# Patient Record
Sex: MALE | Race: BLACK OR AFRICAN AMERICAN | NOT HISPANIC OR LATINO | Employment: FULL TIME | URBAN - METROPOLITAN AREA
[De-identification: names, ages, dates, MRNs, and addresses within clinical notes are randomized per-mention and may not be internally consistent; named-entity substitution may affect disease eponyms.]

---

## 2023-12-10 ENCOUNTER — HOSPITAL ENCOUNTER (EMERGENCY)
Facility: CLINIC | Age: 63
Discharge: HOME | End: 2023-12-10
Attending: EMERGENCY MEDICINE | Admitting: EMERGENCY MEDICINE
Payer: OTHER MISCELLANEOUS

## 2023-12-10 ENCOUNTER — APPOINTMENT (OUTPATIENT)
Dept: GENERAL RADIOLOGY | Facility: CLINIC | Age: 63
End: 2023-12-10
Attending: EMERGENCY MEDICINE

## 2023-12-10 VITALS
DIASTOLIC BLOOD PRESSURE: 80 MMHG | TEMPERATURE: 98.7 F | HEIGHT: 66 IN | WEIGHT: 235 LBS | RESPIRATION RATE: 18 BRPM | BODY MASS INDEX: 37.77 KG/M2 | HEART RATE: 96 BPM | SYSTOLIC BLOOD PRESSURE: 144 MMHG | OXYGEN SATURATION: 99 %

## 2023-12-10 DIAGNOSIS — M54.41 ACUTE RIGHT-SIDED LOW BACK PAIN WITH RIGHT-SIDED SCIATICA: ICD-10-CM

## 2023-12-10 LAB
ANION GAP SERPL CALCULATED.3IONS-SCNC: 8 MMOL/L (ref 7–15)
BUN SERPL-MCNC: 17.7 MG/DL (ref 8–23)
CALCIUM SERPL-MCNC: 9.2 MG/DL (ref 8.8–10.2)
CHLORIDE SERPL-SCNC: 106 MMOL/L (ref 98–107)
CK SERPL-CCNC: 92 U/L (ref 39–308)
CREAT SERPL-MCNC: 1.15 MG/DL (ref 0.67–1.17)
DEPRECATED HCO3 PLAS-SCNC: 28 MMOL/L (ref 22–29)
EGFRCR SERPLBLD CKD-EPI 2021: 72 ML/MIN/1.73M2
GLUCOSE SERPL-MCNC: 89 MG/DL (ref 70–99)
HOLD SPECIMEN: NORMAL
POTASSIUM SERPL-SCNC: 4.4 MMOL/L (ref 3.4–5.3)
SODIUM SERPL-SCNC: 142 MMOL/L (ref 135–145)

## 2023-12-10 PROCEDURE — 36415 COLL VENOUS BLD VENIPUNCTURE: CPT | Performed by: EMERGENCY MEDICINE

## 2023-12-10 PROCEDURE — 73590 X-RAY EXAM OF LOWER LEG: CPT | Mod: RT

## 2023-12-10 PROCEDURE — 82550 ASSAY OF CK (CPK): CPT | Performed by: EMERGENCY MEDICINE

## 2023-12-10 PROCEDURE — 80048 BASIC METABOLIC PNL TOTAL CA: CPT | Performed by: EMERGENCY MEDICINE

## 2023-12-10 PROCEDURE — 99284 EMERGENCY DEPT VISIT MOD MDM: CPT

## 2023-12-10 RX ORDER — OXYCODONE HYDROCHLORIDE 5 MG/1
5 TABLET ORAL EVERY 6 HOURS PRN
Qty: 14 TABLET | Refills: 0 | Status: SHIPPED | OUTPATIENT
Start: 2023-12-10 | End: 2024-02-14

## 2023-12-10 ASSESSMENT — ACTIVITIES OF DAILY LIVING (ADL)
ADLS_ACUITY_SCORE: 35
ADLS_ACUITY_SCORE: 33

## 2023-12-10 NOTE — DISCHARGE INSTRUCTIONS
Recommend 1-2 extra strength Tylenol every 6 hours and or 60 mg ibuprofen every 6 hours for treatment of pain.  May also take 1 oxycodone every 6 hours as needed for breakthrough pain    May ice area of pain 20 minutes, 4 times per day for the next several days    May discontinue previously prescribed ketorolac and Flexeril as discussed

## 2023-12-10 NOTE — ED TRIAGE NOTES
Pt has had back pain that he was taking steroids for. He believes the pain and edema have traveled down to right lower leg. Pt states he had a work up for a blood clot 12/5/23  and was negative. Pain 10/10     Triage Assessment (Adult)       Row Name 12/10/23 0852          Triage Assessment    Airway WDL WDL        Respiratory WDL    Respiratory WDL WDL        Skin Circulation/Temperature WDL    Skin Circulation/Temperature WDL X  right leg edema        Cardiac WDL    Cardiac WDL X  HTN        Peripheral/Neurovascular WDL    Peripheral Neurovascular WDL X  Right leg edema and pain        Cognitive/Neuro/Behavioral WDL    Cognitive/Neuro/Behavioral WDL WDL

## 2023-12-10 NOTE — ED PROVIDER NOTES
"  History     Chief Complaint:  Leg Pain       HPI   Esteban Nciholas is a 63 year old male who presents with sharp right low back radiating down his right leg. 2 weeks ago patient was moving a patient at work and the day after he began to have pain and difficulty moving his left arm secondary to pain (this is since resolved). A week after he developed right lower back pain and was seen for this. He was given Medrol Dosepak which did not resolve his pain. His pain began to radiate down his right leg down to his ankle. 5 days ago he was had a negative lower extremity ultrasound. Here in the ED he denies numbness and recent falls or injuries.  Continues to have pain that radiates down his right leg.    Independent Historian:   None - Patient Only    Review of External Notes:   N/A      Medications:    Methylprednisolone    Flexeril   Diclofenac     Past Medical History:    The patient denies any significant past medical history.     Physical Exam   Patient Vitals for the past 24 hrs:   BP Temp Temp src Pulse Resp SpO2 Height Weight   12/10/23 0851 (!) 144/80 98.7  F (37.1  C) Temporal 96 18 99 % 1.676 m (5' 6\") 106.6 kg (235 lb)        Physical Exam  General: Patient in mild distress.  Alert and cooperative with exam. Normal mentation  HEENT: NC/AT. Conjunctiva without injection or scleral icterus. External ears normal.  Respiratory: Breathing comfortably on room air  CV: Normal rate, all extremities well perfused  GI:  Non-distended abdomen  Skin: Warm, dry, no rashes/open wounds on exposed skin  Musculoskeletal: No obvious deformities. Mild tenderness to right lower lumbar para spinal area without overlying skin change. No lower extremity edema. CMS intact. Able to ambulate without difficulty  Neuro: Alert, answers questions appropriately. No gross motor deficits      Emergency Department Course   Imaging:  XR Tibia and Fibula Right 2 Views   Final Result   IMPRESSION: No fracture or osseous lesion. Mild " degenerative changes in the knee. Soft tissues are unremarkable.             Laboratory:  Labs Ordered and Resulted from Time of ED Arrival to Time of ED Departure   CK TOTAL - Normal       Result Value    CK 92     BASIC METABOLIC PANEL - Normal    Sodium 142      Potassium 4.4      Chloride 106      Carbon Dioxide (CO2) 28      Anion Gap 8      Urea Nitrogen 17.7      Creatinine 1.15      GFR Estimate 72      Calcium 9.2      Glucose 89          Procedures   None     Emergency Department Course & Assessments:  Interventions:  Medications - No data to display     Assessments:  0922 I obtained history and examined the patient as noted above.     Independent Interpretation (X-rays, CTs, rhythm strip):  Right tib-fib x-ray: No fracture or dislocation    Consultations/Discussion of Management or Tests:  None        Social Determinants of Health affecting care:   Employed     Disposition:  The patient was discharged to home.     Impression & Plan    Medical Decision Making:  Esteban Nicholas is a 63 year old male who presents to the emergency department today for evaluation of right-sided low back pain with radiation to his right leg as well as feeling of deep pain in his right lower extremity.  On evaluation patient has no significant lower extremity swelling and recent ultrasound without evidence of DVT.  No overlying skin changes or evidence of skin infection.  Compartments are soft and compressible.  Tib-fib x-ray without significant acute findings.  No evidence of rhabdomyolysis; CK normal.  No history of trauma, fever, or demonstrable weakness/numbness on exam; no indication for MRI.  Presentation most consistent with sciatica.  Discussed supportive care and provided prescription for oxycodone for breakthrough pain.  Recommend continue use of NSAIDs/APAP.  Close follow-up with PCP if not improving.  Return precautions discussed.  Patient discharged home.    Diagnosis:    ICD-10-CM    1. Acute right-sided low back  pain with right-sided sciatica  M54.41            Discharge Medications:  Discharge Medication List as of 12/10/2023 11:59 AM        START taking these medications    Details   oxyCODONE (ROXICODONE) 5 MG tablet Take 1 tablet (5 mg) by mouth every 6 hours as needed for breakthrough pain, Disp-14 tablet, R-0, Local Print                Scribe Disclosure:  I, Simón Johnson, am serving as a scribe at 9:08 AM on 12/10/2023 to document services personally performed by Elian Mendez DO based on my observations and the provider's statements to me.   12/10/2023   Elian Mendez DO O'Neill, Christopher Warren, DO  12/10/23 2111

## 2023-12-20 ENCOUNTER — OFFICE VISIT (OUTPATIENT)
Dept: FAMILY MEDICINE | Facility: CLINIC | Age: 63
End: 2023-12-20
Payer: OTHER MISCELLANEOUS

## 2023-12-20 ENCOUNTER — ANCILLARY PROCEDURE (OUTPATIENT)
Dept: GENERAL RADIOLOGY | Facility: CLINIC | Age: 63
End: 2023-12-20
Attending: PHYSICIAN ASSISTANT
Payer: OTHER MISCELLANEOUS

## 2023-12-20 VITALS
WEIGHT: 234 LBS | HEART RATE: 73 BPM | DIASTOLIC BLOOD PRESSURE: 80 MMHG | SYSTOLIC BLOOD PRESSURE: 140 MMHG | RESPIRATION RATE: 18 BRPM | OXYGEN SATURATION: 98 % | TEMPERATURE: 97.9 F | HEIGHT: 66 IN | BODY MASS INDEX: 37.61 KG/M2

## 2023-12-20 DIAGNOSIS — M54.41 ACUTE RIGHT-SIDED LOW BACK PAIN WITH RIGHT-SIDED SCIATICA: Primary | ICD-10-CM

## 2023-12-20 DIAGNOSIS — M54.41 ACUTE RIGHT-SIDED LOW BACK PAIN WITH RIGHT-SIDED SCIATICA: ICD-10-CM

## 2023-12-20 PROCEDURE — 99204 OFFICE O/P NEW MOD 45 MIN: CPT | Performed by: PHYSICIAN ASSISTANT

## 2023-12-20 PROCEDURE — 72100 X-RAY EXAM L-S SPINE 2/3 VWS: CPT | Mod: TC | Performed by: RADIOLOGY

## 2023-12-20 RX ORDER — IBUPROFEN 200 MG
200 TABLET ORAL EVERY 4 HOURS PRN
COMMUNITY

## 2023-12-20 RX ORDER — OXYCODONE HYDROCHLORIDE 5 MG/1
TABLET ORAL
Qty: 30 TABLET | Refills: 0 | Status: SHIPPED | OUTPATIENT
Start: 2023-12-20 | End: 2024-01-04

## 2023-12-20 RX ORDER — METHYLPREDNISOLONE 4 MG
TABLET, DOSE PACK ORAL
Qty: 21 TABLET | Refills: 0 | Status: SHIPPED | OUTPATIENT
Start: 2023-12-20 | End: 2024-01-11

## 2023-12-20 ASSESSMENT — PAIN SCALES - GENERAL: PAINLEVEL: EXTREME PAIN (8)

## 2023-12-20 NOTE — LETTER
December 20, 2023      Esteban Nicholas  [address]   [place] [address]        To Whom It May Concern:    Esteban Nicholas  was seen on 12/20/23.  Please excuse him until 1/10/23 due to injury.        Sincerely,        Jez Everett PA-C

## 2023-12-20 NOTE — RESULT ENCOUNTER NOTE
Can we let him know his lumbar xray looks good. No fracture or acute issues. Mild degenerative changes

## 2023-12-20 NOTE — PROGRESS NOTES
"  Assessment & Plan     Acute right-sided low back pain with right-sided sciatica  On-going lower lumbar radicular pain. XR lumbar today. PE without red flags. PT referral placed. Tylenol/Motrin alternating. Lidocaine patches. Medrol dosepak. Oxycodone 1-2 5 mg tabs q6 hours. Discussed safety and side effects. Should setup with spine - may need additional imaging MRI. Close follow-up with any additional symptoms. Work note for 3 weeks. Did discuss potential for FMLA.     - XR Lumbar Spine 2/3 Views  - Physical Therapy Referral  - methylPREDNISolone (MEDROL DOSEPAK) 4 MG tablet therapy pack  Dispense: 21 tablet; Refill: 0  - oxyCODONE (ROXICODONE) 5 MG tablet  Dispense: 30 tablet; Refill: 0    30 minutes spent by me on the date of the encounter doing chart review, review of test results, interpretation of tests, patient visit, and documentation      MED REC REQUIRED  Post Medication Reconciliation Status: discharge medications reconciled and changed, per note/orders  BMI:   Estimated body mass index is 37.77 kg/m  as calculated from the following:    Height as of this encounter: 1.676 m (5' 6\").    Weight as of this encounter: 106.1 kg (234 lb).   Weight management plan: Discussed healthy diet and exercise guidelines    The likelihood of other entities in the differential is insufficient to justify any further testing for them at this time. This was explained to the patient. The patient was advised that persistent or worsening symptoms would require further evaluation. Patient advised to call the office and if unable to reach to go to the emergency room if they develop any new or worsening symptoms. Expressed understanding and agreement with above stated plan.     MARTINEZ [facility] DIANNA Orellana is a very pleasant 63 year old, presenting for the following health issues:  ER F/U    ER follow-up for on-going right sided lower back pain with radiation to anterior tib " "region.   Pain started 11/22/23 following lifting a patient at work. Works as a MA.     Alternating Tylenol and Motrin  Oxycodone 5 mg most helpful but no complete resolution of pain.   No numbness/tingling or loss of bowel/bladder. No fever, chills, sweats.     ED/UC Followup:    Facility:  [facility] Emergency Dept  Date of visit: 12/10/23  Reason for visit: Acute right-sided low back pain with right-sided sciatica   Current Status: Pt continues to have pain in waist and right leg 8/10        Review of Systems   Constitutional, HEENT, cardiovascular, pulmonary, GI, , musculoskeletal, neuro, skin, endocrine and psych systems are negative, except as otherwise noted.      Objective    BP (!) 140/80   Pulse 73   Temp 97.9  F (36.6  C) (Temporal)   Resp 18   Ht 1.676 m (5' 6\")   Wt 106.1 kg (234 lb)   SpO2 98%   BMI 37.77 kg/m    Body mass index is 37.77 kg/m .  Physical Exam   GENERAL: healthy, alert and no distress  EYES: Eyes grossly normal to inspection, PERRL and conjunctivae and sclerae normal  NECK: no adenopathy, no asymmetry, masses, or scars and thyroid normal to palpation. ROM intact.   RESP: lungs clear to auscultation - no rales, rhonchi or wheezes  CV: regular rate and rhythm, normal S1 S2, no S3 or S4, no murmur, click or rub, no peripheral edema  MS: no gross musculoskeletal defects noted, no edema  No tenderness noted on palpation of spinous processes. Spinous processes are midline. Cervical, thoracic, and lumbar paraspinal muscles are non tender. Full ROM including flexion, extension, and side to side rotation of thoracic and lumbar spine are noted and without elicited discomfort. Straight leg raise elicits pains. Sensation to upper and lower extremities is intact bilaterally. 5/5 strength to LE.  SKIN: no suspicious lesions or rashes  NEURO: Normal strength and tone, mentation intact and speech normal  PSYCH: mentation appears normal, affect normal/bright            "

## 2023-12-20 NOTE — PATIENT INSTRUCTIONS
-Heat  -Alternate Tylenol/Ibuprofen  -Lidocaine patches OTC   -Medrol dose leodan  -Oxycodone.     Start PT  Xray of lumbar today    Setup with spine  [facility]  [address] [place] [address] [phone]

## 2023-12-26 ENCOUNTER — THERAPY VISIT (OUTPATIENT)
Dept: PHYSICAL THERAPY | Facility: CLINIC | Age: 63
End: 2023-12-26
Attending: PHYSICIAN ASSISTANT
Payer: OTHER MISCELLANEOUS

## 2023-12-26 DIAGNOSIS — M54.41 ACUTE RIGHT-SIDED LOW BACK PAIN WITH RIGHT-SIDED SCIATICA: ICD-10-CM

## 2023-12-26 DIAGNOSIS — R26.9 ABNORMAL GAIT: Primary | ICD-10-CM

## 2023-12-26 PROCEDURE — 97161 PT EVAL LOW COMPLEX 20 MIN: CPT | Mod: GP | Performed by: PHYSICAL THERAPIST

## 2023-12-26 PROCEDURE — 97110 THERAPEUTIC EXERCISES: CPT | Mod: GP | Performed by: PHYSICAL THERAPIST

## 2023-12-26 NOTE — PROGRESS NOTES
PHYSICAL THERAPY EVALUATION  Type of Visit: Evaluation    See electronic medical record for Abuse and Falls Screening details.    Subjective       Presenting condition or subjective complaint:  Sudden onset pain 11/22 after lifting a client. Noticed pain start in the left arm at elbow - needed to massage/rub arm because it had gotten numb. It resolved. About a week later sudden onset pain in the Right buttock that went down lateral LE to ankle. Went to the ED; imaging benign and got prednisone. Prednisone did not help unfortunately; went back to the ED a couple days later. Imaging benign for fx; oxycodone helped for a short period. Was able to renew oxy at the ED (3rd visit) as he did not have a PCP. Has now est with a PCP, who sent him to PT.  Hurts primarily in R ankle, sometimes knee and occasionally R buttock  AGGS: walking, laying on right side and back, bending forward  EASES: oxycodone helpful. NSAIDs a little helpful. / Ice and heat, topicals did not help.   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 +  Hobbies/Interests:  does not work out, physical job     Patient goals for therapy:  Get back to full time work     Pain assessment: see HPI     Objective   LUMBAR SPINE EVALUATION  PAIN:   INTEGUMENTARY (edema, incisions):   POSTURE: lateral shift left, but corrective sideglide right in both standing and prone increased peripheral sx intensity at ankle  GAIT:   Weightbearing Status: WBAT  Assistive Device(s): None  Gait Deviations: Decreased gait speed, lacks terminal  knee extension on left, with decreased left stance time and right stride length, lacks left knee flexion during swing phase. Antalgic. Improved with use of SPC but not normalized  BALANCE/PROPRIOCEPTION: not formally assessed  WEIGHTBEARING ALIGNMENT: lateral shift left   NON-WEIGHTBEARING ALIGNMENT: lateral shift left; lateral gliding right increased peripheral sx. / sidelying left with left sideglide was only position that did not peripheralize sx (no worse)    ROM: Lumbar flexion limited approx 25% / lumbar extension with repeated motions no worse to peripheral sx.   PELVIC/SI SCREEN:   STRENGTH:   MYOTOMES: intact along LE myotomes and 5/5 hip abduction/adduction and hip ER/IR. EXCEPT right hip flexion and knee extension weak and painful   DTR S: not assessed  CORD SIGNS: denies cauda equina red flags (bowel/bladder changes, sudden sensation or strength changes, saddle anesthesia)  DERMATOMES: not assessed  NEURAL TENSION: + SLUMP R      Assessment & Plan   CLINICAL IMPRESSIONS  Medical Diagnosis: Acute right-sided low back pain with right-sided sciatica (M54.41)    Treatment Diagnosis: acute low back pain with radicular pain - lateral shift L   Impression/Assessment: Patient is a 63 year old male with R sided radicular LE pain complaints.  The following significant findings have been identified: Pain, Decreased ROM/flexibility, Decreased strength, Impaired gait, Impaired muscle performance, Decreased activity tolerance, and Impaired posture. These impairments interfere with their ability to perform self care tasks, work tasks, recreational activities, household chores, driving , household mobility, and community mobility as compared to previous level of function.     Clinical Decision Making (Complexity):  Clinical Presentation: Stable/Uncomplicated  Clinical Presentation Rationale: based on medical and personal factors listed in PT evaluation  Clinical Decision Making (Complexity): Low complexity    PLAN OF  CARE  Treatment Interventions:  Modalities: Cryotherapy, Fluidotherapy, E-stim  Interventions: Gait Training, Manual Therapy, Neuromuscular Re-education, Therapeutic Activity, Therapeutic Exercise, Self-Care/Home Management    Long Term Goals     PT Goal 1  Goal Identifier: Ambulation  Goal Description: Patient will report ability to ambulate half the day at work with normalized gait, no AD, and no increase in pain to promote a healthy and active lifestyle as well as return to work fully.  Target Date: 03/19/24  PT Goal 2  Goal Identifier: Lifting/bending  Goal Description: Patient will demonstrate lifting 25# item from floor to waist height with hip hinge strategy and no increase in pain x 5 reps to indicate ability to complete household tasks such as dressing, groceries, laundry, and yardwork.  Target Date: 03/19/24      Frequency of Treatment: 1x/weekly, tapering to every other week  Duration of Treatment: up to 12 weeks    Recommended Referrals to Other Professionals:  none  Education Assessment:   Education Comments: PTRx handouts    Risks and benefits of evaluation/treatment have been explained.   Patient/Family/caregiver agrees with Plan of Care.     Evaluation Time:     PT Eval, Low Complexity Minutes (64866): 20       Signing Clinician: CJ RESENDEZ, PT

## 2024-01-02 DIAGNOSIS — M54.41 ACUTE RIGHT-SIDED LOW BACK PAIN WITH RIGHT-SIDED SCIATICA: ICD-10-CM

## 2024-01-02 NOTE — TELEPHONE ENCOUNTER
Medication Question or Refill    Contacts         Type Contact Phone/Fax    01/02/2024 09:20 AM CST Phone (Incoming) Esteban Nicholas VAISHALI (Self) [phone] (H)     Need refill of Oxycodone called in to [facility]. Please call back to let them know when called in.            What medication are you calling about (include dose and sig)?: Oxycodone 5mg.     Preferred Pharmacy:   [id number] - [place], [place] - [address] [address]  Phone: [phone] Fax: [phone]      Controlled Substance Agreement on file:   CSA -- Patient Level:    CSA: None found at the patient level.       Who prescribed the medication?: Dr. Jez Everett    Do you need a refill? Yes    When did you use the medication last? A week ago    Patient offered an appointment? Yes: Appt via phone 01/11/2024    Do you have any questions or concerns?  No      Okay to leave a detailed message?: Yes at Cell number on file:    Telephone Information:   Mobile [phone]

## 2024-01-04 RX ORDER — OXYCODONE HYDROCHLORIDE 5 MG/1
TABLET ORAL
Qty: 30 TABLET | Refills: 0 | Status: SHIPPED | OUTPATIENT
Start: 2024-01-04 | End: 2024-01-11

## 2024-01-10 ENCOUNTER — THERAPY VISIT (OUTPATIENT)
Dept: PHYSICAL THERAPY | Facility: CLINIC | Age: 64
End: 2024-01-10
Payer: OTHER MISCELLANEOUS

## 2024-01-10 DIAGNOSIS — R26.9 ABNORMAL GAIT: ICD-10-CM

## 2024-01-10 DIAGNOSIS — M54.41 ACUTE RIGHT-SIDED LOW BACK PAIN WITH RIGHT-SIDED SCIATICA: Primary | ICD-10-CM

## 2024-01-10 PROCEDURE — 97110 THERAPEUTIC EXERCISES: CPT | Mod: GP

## 2024-01-11 ENCOUNTER — VIRTUAL VISIT (OUTPATIENT)
Dept: FAMILY MEDICINE | Facility: CLINIC | Age: 64
End: 2024-01-11
Payer: OTHER MISCELLANEOUS

## 2024-01-11 DIAGNOSIS — M54.41 ACUTE RIGHT-SIDED LOW BACK PAIN WITH RIGHT-SIDED SCIATICA: ICD-10-CM

## 2024-01-11 PROCEDURE — 99442 PR PHYSICIAN TELEPHONE EVALUATION 11-20 MIN: CPT | Mod: 93 | Performed by: PHYSICIAN ASSISTANT

## 2024-01-11 RX ORDER — METHYLPREDNISOLONE 4 MG
TABLET, DOSE PACK ORAL
Qty: 21 TABLET | Refills: 0 | Status: SHIPPED | OUTPATIENT
Start: 2024-01-11 | End: 2024-05-09

## 2024-01-11 RX ORDER — OXYCODONE HYDROCHLORIDE 5 MG/1
TABLET ORAL
Qty: 30 TABLET | Refills: 0 | Status: SHIPPED | OUTPATIENT
Start: 2024-01-11 | End: 2024-05-09

## 2024-01-11 NOTE — LETTER
January 11, 2024      Esteban Nicholas  [address]   [place] [address]        To Whom It May Concern:    Esteban Nicholas was seen on 1/11/24.  Please excuse him until 3/11/24 due to injury.      Sincerely,        Jez Everett PA-C

## 2024-01-11 NOTE — PROGRESS NOTES
Esteban is a 63 year old who is being evaluated via a billable telephone visit.      What phone number would you like to be contacted at? [phone]  How would you like to obtain your AVS? Edith    Distant Location (provider location):  On-site    Assessment & Plan     Acute right-sided low back pain with right-sided sciatica  Reasonable to repeat Medrol Dosepak as well as to provide him with additional refill of oxycodone.  Discussed with him that this is a short-term refill.  Not intended for long-term use.  Discussed safety and side effects and negative nature of this medication.  Continue physical therapy.  Spine referral placed for further evaluation.  Reviewed signs symptoms that warrant urgent/emergent reevaluation.  Comfortable with plan.  - methylPREDNISolone (MEDROL DOSEPAK) 4 MG tablet therapy pack  Dispense: 21 tablet; Refill: 0  - oxyCODONE (ROXICODONE) 5 MG tablet  Dispense: 30 tablet; Refill: 0  - Spine  Referral    15 minutes spent by me on the date of the encounter doing chart review, review of test results, interpretation of tests, patient visit, and documentation      MED REC REQUIRED  Post Medication Reconciliation Status: discharge medications reconciled and changed, per note/orders    The likelihood of other entities in the differential is insufficient to justify any further testing for them at this time. This was explained to the patient. The patient was advised that persistent or worsening symptoms would require further evaluation. Patient advised to call the office and if unable to reach to go to the emergency room if they develop any new or worsening symptoms. Expressed understanding and agreement with above stated plan.     Jez Everett PA-C  [facility] DIANNA Orellana is a 63 year old, presenting for the following health issues:  Establish Care, Refill Request ( Need refill of Oxycodone ), and Form Request (Work note , excuse from work  1-2 month, Fax [phone], call him to notify of action with note)    Following up in regards to ongoing right lower back/right lower leg pain.  Mildly resolved since last time he had a visit.  Has started physical therapy which she believes is slowly helping.  Still unable to walk.  Requesting additional work note to keep him out of work due to this.  Notes pain in his right lower extremity when walking for greater than 5 minutes or for standing for prolonged periods of time.  Pain most notable in the right ankle and right thigh region.  Requesting refill on Medrol Dosepak as well as oxycodone.  No loss of bowel/bladder.  No fever, chills, sweats.      Review of Systems   Constitutional, HEENT, cardiovascular, pulmonary, GI, , musculoskeletal, neuro, skin, endocrine and psych systems are negative, except as otherwise noted.      Objective           Vitals:  No vitals were obtained today due to virtual visit.    Physical Exam   healthy, alert, and no distress  PSYCH: Alert and oriented times 3; coherent speech, normal   rate and volume, able to articulate logical thoughts, able   to abstract reason, no tangential thoughts, no hallucinations   or delusions  His affect is normal  RESP: No cough, no audible wheezing, able to talk in full sentences  Remainder of exam unable to be completed due to telephone visits          Phone call duration:  10 minutes

## 2024-01-18 ENCOUNTER — THERAPY VISIT (OUTPATIENT)
Dept: PHYSICAL THERAPY | Facility: CLINIC | Age: 64
End: 2024-01-18
Attending: PHYSICIAN ASSISTANT
Payer: OTHER MISCELLANEOUS

## 2024-01-18 DIAGNOSIS — R26.9 ABNORMAL GAIT: ICD-10-CM

## 2024-01-18 DIAGNOSIS — M54.41 ACUTE RIGHT-SIDED LOW BACK PAIN WITH RIGHT-SIDED SCIATICA: Primary | ICD-10-CM

## 2024-01-18 PROCEDURE — 97140 MANUAL THERAPY 1/> REGIONS: CPT | Mod: GP | Performed by: PHYSICAL THERAPIST

## 2024-01-18 PROCEDURE — 97110 THERAPEUTIC EXERCISES: CPT | Mod: GP | Performed by: PHYSICAL THERAPIST

## 2024-01-25 ENCOUNTER — THERAPY VISIT (OUTPATIENT)
Dept: PHYSICAL THERAPY | Facility: CLINIC | Age: 64
End: 2024-01-25
Attending: PHYSICIAN ASSISTANT
Payer: OTHER MISCELLANEOUS

## 2024-01-25 DIAGNOSIS — M54.41 ACUTE RIGHT-SIDED LOW BACK PAIN WITH RIGHT-SIDED SCIATICA: Primary | ICD-10-CM

## 2024-01-25 DIAGNOSIS — R26.9 ABNORMAL GAIT: ICD-10-CM

## 2024-01-25 PROCEDURE — 97110 THERAPEUTIC EXERCISES: CPT | Mod: GP | Performed by: PHYSICAL THERAPIST

## 2024-01-25 PROCEDURE — 97140 MANUAL THERAPY 1/> REGIONS: CPT | Mod: GP | Performed by: PHYSICAL THERAPIST

## 2024-02-01 ENCOUNTER — THERAPY VISIT (OUTPATIENT)
Dept: PHYSICAL THERAPY | Facility: CLINIC | Age: 64
End: 2024-02-01
Payer: OTHER MISCELLANEOUS

## 2024-02-01 DIAGNOSIS — R26.9 ABNORMAL GAIT: ICD-10-CM

## 2024-02-01 DIAGNOSIS — M54.41 ACUTE RIGHT-SIDED LOW BACK PAIN WITH RIGHT-SIDED SCIATICA: Primary | ICD-10-CM

## 2024-02-01 PROCEDURE — 97110 THERAPEUTIC EXERCISES: CPT | Mod: GP | Performed by: PHYSICAL THERAPIST

## 2024-02-01 PROCEDURE — 97530 THERAPEUTIC ACTIVITIES: CPT | Mod: GP | Performed by: PHYSICAL THERAPIST

## 2024-02-01 PROCEDURE — 97140 MANUAL THERAPY 1/> REGIONS: CPT | Mod: GP | Performed by: PHYSICAL THERAPIST

## 2024-02-08 ENCOUNTER — THERAPY VISIT (OUTPATIENT)
Dept: PHYSICAL THERAPY | Facility: CLINIC | Age: 64
End: 2024-02-08
Payer: OTHER MISCELLANEOUS

## 2024-02-08 DIAGNOSIS — M54.41 ACUTE RIGHT-SIDED LOW BACK PAIN WITH RIGHT-SIDED SCIATICA: Primary | ICD-10-CM

## 2024-02-08 DIAGNOSIS — R26.9 ABNORMAL GAIT: ICD-10-CM

## 2024-02-08 PROCEDURE — 97140 MANUAL THERAPY 1/> REGIONS: CPT | Mod: GP | Performed by: PHYSICAL THERAPIST

## 2024-02-08 PROCEDURE — 97110 THERAPEUTIC EXERCISES: CPT | Mod: GP | Performed by: PHYSICAL THERAPIST

## 2024-02-14 ENCOUNTER — OFFICE VISIT (OUTPATIENT)
Dept: FAMILY MEDICINE | Facility: CLINIC | Age: 64
End: 2024-02-14
Payer: OTHER MISCELLANEOUS

## 2024-02-14 VITALS
SYSTOLIC BLOOD PRESSURE: 142 MMHG | HEART RATE: 77 BPM | DIASTOLIC BLOOD PRESSURE: 85 MMHG | BODY MASS INDEX: 38.63 KG/M2 | TEMPERATURE: 98 F | OXYGEN SATURATION: 100 % | WEIGHT: 240.4 LBS | RESPIRATION RATE: 18 BRPM | HEIGHT: 66 IN

## 2024-02-14 DIAGNOSIS — R03.0 ELEVATED BP WITHOUT DIAGNOSIS OF HYPERTENSION: ICD-10-CM

## 2024-02-14 DIAGNOSIS — M79.89 LEG SWELLING: ICD-10-CM

## 2024-02-14 DIAGNOSIS — M54.41 ACUTE RIGHT-SIDED LOW BACK PAIN WITH RIGHT-SIDED SCIATICA: Primary | ICD-10-CM

## 2024-02-14 PROCEDURE — 99214 OFFICE O/P EST MOD 30 MIN: CPT | Performed by: PHYSICIAN ASSISTANT

## 2024-02-14 RX ORDER — GABAPENTIN 100 MG/1
100 CAPSULE ORAL DAILY PRN
Qty: 90 CAPSULE | Refills: 1 | Status: SHIPPED | OUTPATIENT
Start: 2024-02-14 | End: 2024-06-04

## 2024-02-14 RX ORDER — OXYCODONE AND ACETAMINOPHEN 5; 325 MG/1; MG/1
1 TABLET ORAL EVERY 6 HOURS PRN
Qty: 15 TABLET | Refills: 0 | Status: SHIPPED | OUTPATIENT
Start: 2024-02-14 | End: 2024-04-09

## 2024-02-14 ASSESSMENT — PAIN SCALES - GENERAL: PAINLEVEL: SEVERE PAIN (6)

## 2024-02-14 NOTE — PATIENT INSTRUCTIONS
-Setup with Spine/Ortho: Call [phone] to schedule.  -Continue PT - keep up the good work!   -Compression stockings, leg elevations for leg swelling.    - BP cuff - check BP at home. Bring in readings at next visit.     Pain:     -Will send refill of oxycodone. Limit if possible. Only for severe pain.   -Start Gabapentin for pain:     Gabapentin 1 tab= 100mg   AM   PM   Bedtime   0   0   100mg (1 tab). If tolerating, after 2-3 days, increase as tolerated to next line   100mg (1 tab)      100mg (1 tab)       100mg (1 tab). If tolerating, after 2-3 days, increase as tolerated to next line   200mg (2 tabs)  200mg (2 tabs)  200mg (2 tabs). If tolerating, after 2-3 days, increase as tolerated to next line   300mg (3 tabs)  300mg (3 tabs)  300mg (3 tabs). Call us when you are at this dose or with any concerns.   Don't drive on this medication until you know how it effects you.  Common side effects are drowsiness and dizziness  You can go slower if you need to.  For example, you can start to increase by just going up on the bedtime dose.  This medication cannot be stopped abruptly once higher doses are achieved.  Seek medical advised on how to stop this medication if needed.

## 2024-02-14 NOTE — PROGRESS NOTES
Assessment & Plan     Acute right-sided low back pain with right-sided sciatica  Provided him referral to spine specialist.  Will send oxycodone for him to use only as needed for severe pain.  Safety/side effects reviewed.  Reasonable to start gabapentin.  Discussed side effects and expectations.  Can gradually increase.  Start 100 mg nightly.  Comfortable with this plan.  Follow-up in 1 month for FMLA check-in.  - oxyCODONE-acetaminophen (PERCOCET) 5-325 MG tablet  Dispense: 15 tablet; Refill: 0  - gabapentin (NEURONTIN) 100 MG capsule  Dispense: 90 capsule; Refill: 1    Elevated BP without diagnosis of hypertension  Elevated initial reading.  Upon repeat 142/85.  140/80 last visit.  No prior history of hypertension.  Encouraged him to purchase a blood pressure cuff at home.  Monitor readings and bring to next visit.  Limit salt.  Continue to stay active.    Leg swelling  Continue compression socks as well as leg elevation.  Happy seeing improvement.  Low suspicion for blood clot.  This was evaluated earlier in December.  Suspect venous insufficiency.  Chronic issue for him.  Urgent care visit 2022.      30 minutes spent by me on the date of the encounter on chart review, review of test results, interpretation of tests, patient visit, and documentation         No follow-ups on file.    The likelihood of other entities in the differential is insufficient to justify any further testing for them at this time. This was explained to the patient. The patient was advised that persistent or worsening symptoms would require further evaluation. Patient advised to call the office and if unable to reach to go to the emergency room if they develop any new or worsening symptoms. Expressed understanding and agreement with above stated plan.     MARTINEZ [facility] DIANNA Nicholas is a 63 year old male presenting for the following health issues:  Patient presents with:  Back  "Pain    Here today for follow-up for right-sided lower back pain with right-sided sciatica.  Making great progress with physical therapy team.  Previously only able to walk approximately 10 minutes without pain now up to almost 30 minutes.  Pain better controlled.  Requesting refill of oxycodone to be used as needed.  Reviewed safety and side effects of this.  Not ideal long-term.  He is in agreement with this.    LA expires March 11.  Plan to meet prior to this to discuss extension which at this time seems likely.    Note some bilateral lower extremity swelling.  Given compression socks by physical therapy team and is already seeing some improvement.  No shortness of breath or chest pain.  Legs nonpainful no erythema.    Needs follow-up with spine team for further evaluation.    Review of Systems   Constitutional, HEENT, cardiovascular, pulmonary, GI, , musculoskeletal, neuro, skin, endocrine and psych systems are negative, except as otherwise noted.      Objective    BP (!) 142/85   Pulse 77   Temp 98  F (36.7  C) (Temporal)   Resp 18   Ht 1.676 m (5' 6\")   Wt 109 kg (240 lb 6.4 oz)   SpO2 100%   BMI 38.80 kg/m    5' 6\"  240 lbs 6.4 oz    Physical Exam   GENERAL: healthy, alert and no distress  EYES: Eyes grossly normal to inspection, PERRL and conjunctivae and sclerae normal  NECK: no adenopathy, no asymmetry, masses, or scars and thyroid normal to palpation  RESP: lungs clear to auscultation - no rales, rhonchi or wheezes  CV: regular rate and rhythm, normal S1 S2, no S3 or S4, no murmur, click or rub, trace bilateral nonpitting peripheral edema and peripheral pulses strong  MS: no gross musculoskeletal defects noted, no edema  SKIN: no suspicious lesions or rashes  NEURO: Ambulating appropriately.  Slightly antalgic gait.  Normal strength and tone, mentation intact and speech normal  PSYCH: mentation appears normal, affect normal/bright      Answers submitted by the patient for this visit:  Back Pain " Visit Questionnaire (Submitted on 2/14/2024)  Your back pain is: recurring  Chronic or Recurring Back Pain Visit Questionnaire (Submitted on 2/14/2024)  Where is your back pain located? : right lower back  How would you describe your back pain? : shooting  Where does your back pain spread? : right thigh  Since you noticed your back pain, how has it changed? : gradually improving  Does your back pain interfere with your job?: Not applicable  General Questionnaire (Submitted on 2/14/2024)  Chief Complaint: Chronic problems general questions HPI Form  What is the reason for your visit today? : for my  How many servings of fruits and vegetables do you eat daily?: 0-1  On average, how many sweetened beverages do you drink each day (Examples: soda, juice, sweet tea, etc.  Do NOT count diet or artificially sweetened beverages)?: 1  How many minutes a day do you exercise enough to make your heart beat faster?: 9 or less  How many days a week do you exercise enough to make your heart beat faster?: 6  How many days per week do you miss taking your medication?: 0

## 2024-02-15 ENCOUNTER — THERAPY VISIT (OUTPATIENT)
Dept: PHYSICAL THERAPY | Facility: CLINIC | Age: 64
End: 2024-02-15
Payer: OTHER MISCELLANEOUS

## 2024-02-15 DIAGNOSIS — M54.41 ACUTE RIGHT-SIDED LOW BACK PAIN WITH RIGHT-SIDED SCIATICA: Primary | ICD-10-CM

## 2024-02-15 DIAGNOSIS — R26.9 ABNORMAL GAIT: ICD-10-CM

## 2024-02-15 PROCEDURE — 97140 MANUAL THERAPY 1/> REGIONS: CPT | Mod: GP | Performed by: PHYSICAL THERAPIST

## 2024-02-22 ENCOUNTER — THERAPY VISIT (OUTPATIENT)
Dept: PHYSICAL THERAPY | Facility: CLINIC | Age: 64
End: 2024-02-22
Payer: OTHER MISCELLANEOUS

## 2024-02-22 DIAGNOSIS — R26.9 ABNORMAL GAIT: ICD-10-CM

## 2024-02-22 DIAGNOSIS — M54.41 ACUTE RIGHT-SIDED LOW BACK PAIN WITH RIGHT-SIDED SCIATICA: Primary | ICD-10-CM

## 2024-02-22 PROCEDURE — 97530 THERAPEUTIC ACTIVITIES: CPT | Mod: GP | Performed by: PHYSICAL THERAPIST

## 2024-02-22 PROCEDURE — 97140 MANUAL THERAPY 1/> REGIONS: CPT | Mod: GP | Performed by: PHYSICAL THERAPIST

## 2024-02-22 PROCEDURE — 97110 THERAPEUTIC EXERCISES: CPT | Mod: GP | Performed by: PHYSICAL THERAPIST

## 2024-02-22 NOTE — PROGRESS NOTES
02/22/24 0500   Appointment Info   Signing clinician's name / credentials Celia Milner, PT, DPT, OCS   Total/Authorized Visits 12   Visits Used 7   Medical Diagnosis Acute right-sided low back pain with right-sided sciatica (M54.41)   PT Tx Diagnosis acute low back pain with radicular pain - lateral shift L   Other pertinent information started late 5' clinic // workers comp   Progress Note/Certification   Onset of illness/injury or Date of Surgery 11/22/23  (initial DOI)   Therapy Frequency 1x/weekly, tapering to every other week   Predicted Duration up to 12 weeks   Progress Note Due Date 02/23/24   Progress Note Completed Date 12/26/23   GOALS   PT Goals 2   PT Goal 1   Goal Identifier Ambulation   Goal Description Patient will report ability to ambulate half the day at work with normalized gait, no AD, and no increase in pain to promote a healthy and active lifestyle as well as return to work fully.   Goal Progress Continues to demonstrate antalgic gait with limp in intial steps. This improves slightly with continued gait.   Target Date 03/19/24   PT Goal 2   Goal Identifier Lifting/bending   Goal Description Patient will demonstrate lifting 25# item from floor to waist height with hip hinge strategy and no increase in pain x 5 reps to indicate ability to complete household tasks such as dressing, groceries, laundry, and yardwork.   Goal Progress Improved RADHA but unable to lift due to pain   Target Date 03/19/24   Subjective Report   Subjective Report Able to ambulate slowly for 40 min, finding he is limping by the end. Advised a walking stick. RADHA siginficantly improved but still in moderate pain disability rating.   Objective Measures   Objective Measures Objective Measure 1;Objective Measure 2;Objective Measure 3;Objective Measure 4;Objective Measure 5   Objective Measure 1   Objective Measure Lumbar AROM   Objective Measure 2   Objective Measure Radicular sx   Details Improved with manual (less painful at  ankle). Gillet's + left, R anterior innom improved with manual   Objective Measure 4   Objective Measure Oswestry   Details 40% (moderate)   Objective Measure 5   Objective Measure Edema   Treatment Interventions (PT)   Interventions Therapeutic Procedure/Exercise;Gait Training;Manual Therapy;Therapeutic Activity   Therapeutic Procedure/Exercise   Therapeutic Procedures: strength, endurance, ROM, flexibillity minutes (27565) 10   Therapeutic Procedures Ther Proc 2;Ther Proc 3;Ther Proc 4   Ther Proc 1 Pelvic tilt   Ther Proc 2 glut sets   Ther Proc 3 butterfly AROM   Ther Proc 3 - Details x 20 following manual   Ther Proc 4 bilat pirformis str supine   PTRx Ther Proc 1 trunk rotations   PTRx Ther Proc 2 prone pressups   PTRx Ther Proc 3 alt hip exts   PTRx Ther Proc 4 standing TA sets for SI stab   PTRx Ther Proc 5 nustep   PTRx Ther Proc 5 - Details x 5 min   Skilled Intervention pt educ   Patient Response/Progress good gains neg SI tests   Therapeutic Activity   Therapeutic Activities: dynamic activities to improve functional performance minutes (24056) 10   Therapeutic Activities Ther Act 2   Ther Act 1 RADHA retake and interpretation   Ther Act 1 - Details x 7 min   Ther Act 2 Walking stick ed   Ther Act 2 - Details recommend this for longer walks, practiced with a march today   Patient Response/Progress appropriate questions   Manual Therapy   Manual Therapy: Mobilization, MFR, MLD, friction massage minutes (12453) 15   Manual Therapy Manual Therapy 2;Manual Therapy 3;Manual Therapy 4;Manual Therapy 5;Manual Therapy 6;Manual Therapy 7;Manual Therapy 8   Manual Therapy 1 Manual pin and stretch R hip flexor   Manual Therapy 2 MET R anterior innom   Manual Therapy 2 - Details multiple angles hip flexion L, IR and adduction L x 20 total   Manual Therapy 3 MET pubic symph   Manual Therapy 3 - Details x 5   Manual Therapy 4 STM over lat hip, itband   Manual Therapy 5 pass quad str in slying   Manual Therapy 6 dural  release over LS/TS   Manual Therapy 7 long axis hip traction   Manual Therapy 8 SI SNAGs prone   Skilled Intervention assessment and application of contract relax/MET   Patient Response/Progress decreased ankle sx, improved gait on ambulation out of Laura garnett's improved following   Manual Therapy 7 - Details 2 x 1 min sustained grade 2 mob, + x 2 HVLAT gr 5 on R hip   Education   Education Comments PTRx handouts   Plan   Home program initiated, daily   Updates to plan of care MWM   Plan for next session spinal mob with leg movement, knee flexion lever (add ankle DF?) 3 x 6 (pg 27), nustep, sidegliding in standing   Comments   Comments Recommend continued skilled PT. Pt has made progress with ambulation endurance and function, but still scores in a moderate disability range.   Total Session Time   Timed Code Treatment Minutes 35   Total Treatment Time (sum of timed and untimed services) 35       PLAN  Continue therapy per current plan of care.  Recommend continued skilled PT. Pt has made progress with ambulation endurance and function, but still scores in a moderate disability range.    Beginning/End Dates of Progress Note Reporting Period:  12/26/23 to 02/22/2024    Referring Provider:  Jez Everett

## 2024-02-29 ENCOUNTER — THERAPY VISIT (OUTPATIENT)
Dept: PHYSICAL THERAPY | Facility: CLINIC | Age: 64
End: 2024-02-29
Payer: OTHER MISCELLANEOUS

## 2024-02-29 DIAGNOSIS — M54.41 ACUTE RIGHT-SIDED LOW BACK PAIN WITH RIGHT-SIDED SCIATICA: Primary | ICD-10-CM

## 2024-02-29 DIAGNOSIS — R26.9 ABNORMAL GAIT: ICD-10-CM

## 2024-02-29 PROCEDURE — 97110 THERAPEUTIC EXERCISES: CPT | Mod: GP | Performed by: PHYSICAL THERAPIST

## 2024-02-29 PROCEDURE — 97530 THERAPEUTIC ACTIVITIES: CPT | Mod: GP | Performed by: PHYSICAL THERAPIST

## 2024-02-29 PROCEDURE — 97140 MANUAL THERAPY 1/> REGIONS: CPT | Mod: GP | Performed by: PHYSICAL THERAPIST

## 2024-03-07 ENCOUNTER — VIRTUAL VISIT (OUTPATIENT)
Dept: FAMILY MEDICINE | Facility: CLINIC | Age: 64
End: 2024-03-07
Payer: OTHER MISCELLANEOUS

## 2024-03-07 DIAGNOSIS — M54.41 ACUTE RIGHT-SIDED LOW BACK PAIN WITH RIGHT-SIDED SCIATICA: Primary | ICD-10-CM

## 2024-03-07 DIAGNOSIS — M79.604 PAIN OF RIGHT LOWER EXTREMITY: ICD-10-CM

## 2024-03-07 PROCEDURE — 99442 PR PHYSICIAN TELEPHONE EVALUATION 11-20 MIN: CPT | Mod: 93 | Performed by: PHYSICIAN ASSISTANT

## 2024-03-07 NOTE — PROGRESS NOTES
Esteban is a 63 year old who is being evaluated via a billable telephone visit.      What phone number would you like to be contacted at? [phone]   How would you like to obtain your AVS? Edith  Originating Location (pt. Location): Home    Distant Location (provider location):  On-site    Assessment & Plan     Acute right-sided low back pain with right-sided sciatica  Pain of right lower extremity    Letter signed and will be faxed.  Start gabapentin.  Continue close follow-up with physical therapy.  Option for spine referral discussed once again if symptoms persist which he declines at this time.    20 minutes spent by me on the date of the encounter doing chart review, review of test results, interpretation of tests, patient visit, and documentation       Subjective   Esteban is a 63 year old, presenting for the following health issues:  Follow Up and Forms (FMLA extension )    Here today via phone visit to discuss FMLA.  Continues to make good progress with physical therapy.  Able to walk now for 1 hour without symptoms.  Has yet to start gabapentin.  Plans to pick this up today.  Wishes to extend FMLA an additional month which is reasonable.  Job currently requires him to stand for 6 to 7 hours per shift.  FMLA expiring on 311.    Requesting letter be faxed to [phone]  Attn: Kush       Review of Systems  Constitutional, neuro, ENT, endocrine, pulmonary, cardiac, gastrointestinal, genitourinary, musculoskeletal, integument and psychiatric systems are negative, except as otherwise noted.      Objective    Vitals - Patient Reported  Pain Score: Moderate Pain (5)      Vitals:  No vitals were obtained today due to virtual visit.    Physical Exam   General: Alert and no distress //Respiratory: No audible wheeze, cough, or shortness of breath // Psychiatric:  Appropriate affect, tone, and pace of words          Phone call duration:  minutes    The likelihood of other entities in the differential is  insufficient to justify any further testing for them at this time. This was explained to the patient. The patient was advised that persistent or worsening symptoms would require further evaluation. Patient advised to call the office and if unable to reach to go to the emergency room if they develop any new or worsening symptoms. Expressed understanding and agreement with above stated plan.     Signed Electronically by: Jez Everett PA-C

## 2024-03-07 NOTE — LETTER
March 7, 2024      Esteban Nicholas  [address] [place] [id number]        To Whom It May Concern:    Esteban Nicholas  was seen on 3/7/24.  Please extend his leave of absence from work him until 4/11/2024 due to injury.      Sincerely,      Jez Everett PA-C

## 2024-03-15 ENCOUNTER — THERAPY VISIT (OUTPATIENT)
Dept: PHYSICAL THERAPY | Facility: CLINIC | Age: 64
End: 2024-03-15
Payer: OTHER MISCELLANEOUS

## 2024-03-15 DIAGNOSIS — M54.41 ACUTE RIGHT-SIDED LOW BACK PAIN WITH RIGHT-SIDED SCIATICA: Primary | ICD-10-CM

## 2024-03-15 DIAGNOSIS — R26.9 ABNORMAL GAIT: ICD-10-CM

## 2024-03-15 PROCEDURE — 97110 THERAPEUTIC EXERCISES: CPT | Mod: GP | Performed by: PHYSICAL THERAPIST

## 2024-03-15 PROCEDURE — 97140 MANUAL THERAPY 1/> REGIONS: CPT | Mod: GP | Performed by: PHYSICAL THERAPIST

## 2024-03-15 PROCEDURE — 97530 THERAPEUTIC ACTIVITIES: CPT | Mod: GP | Performed by: PHYSICAL THERAPIST

## 2024-03-28 ENCOUNTER — THERAPY VISIT (OUTPATIENT)
Dept: PHYSICAL THERAPY | Facility: CLINIC | Age: 64
End: 2024-03-28
Payer: OTHER MISCELLANEOUS

## 2024-03-28 DIAGNOSIS — M54.41 ACUTE RIGHT-SIDED LOW BACK PAIN WITH RIGHT-SIDED SCIATICA: Primary | ICD-10-CM

## 2024-03-28 DIAGNOSIS — R26.9 ABNORMAL GAIT: ICD-10-CM

## 2024-03-28 PROCEDURE — 97110 THERAPEUTIC EXERCISES: CPT | Mod: GP | Performed by: PHYSICAL THERAPIST

## 2024-03-28 PROCEDURE — 97140 MANUAL THERAPY 1/> REGIONS: CPT | Mod: GP | Performed by: PHYSICAL THERAPIST

## 2024-04-09 ENCOUNTER — TELEPHONE (OUTPATIENT)
Dept: FAMILY MEDICINE | Facility: CLINIC | Age: 64
End: 2024-04-09

## 2024-04-09 ENCOUNTER — VIRTUAL VISIT (OUTPATIENT)
Dept: FAMILY MEDICINE | Facility: CLINIC | Age: 64
End: 2024-04-09
Payer: OTHER MISCELLANEOUS

## 2024-04-09 DIAGNOSIS — M54.41 ACUTE RIGHT-SIDED LOW BACK PAIN WITH RIGHT-SIDED SCIATICA: Primary | ICD-10-CM

## 2024-04-09 DIAGNOSIS — M79.604 PAIN OF RIGHT LOWER EXTREMITY: ICD-10-CM

## 2024-04-09 PROCEDURE — 99442 PR PHYSICIAN TELEPHONE EVALUATION 11-20 MIN: CPT | Mod: 93 | Performed by: PHYSICIAN ASSISTANT

## 2024-04-09 RX ORDER — OXYCODONE AND ACETAMINOPHEN 5; 325 MG/1; MG/1
1 TABLET ORAL EVERY 6 HOURS PRN
Qty: 15 TABLET | Refills: 0 | Status: SHIPPED | OUTPATIENT
Start: 2024-04-09 | End: 2024-05-09

## 2024-04-09 NOTE — PROGRESS NOTES
Esteban is a 64 year old who is being evaluated via a billable video visit.    What phone number would you like to be contacted at? [phone]  How would you like to obtain your AVS? Mail a copy      Assessment & Plan     Acute right-sided low back pain with right-sided sciatica  Pain of right lower extremity    Refilled medication.  Reviewed safety/side effects.  Continue gabapentin.  Option to titrate this up further as needed for pain.  Continue to work with physical therapy.  Extension of FMLA.  Hopefully we can get him back to work soon.  Making good progress.  Has option to set up with spine if needed.    - oxyCODONE-acetaminophen (PERCOCET) 5-325 MG tablet  Dispense: 15 tablet; Refill: 0    15 minutes spent by me on the date of the encounter doing chart review, review of test results, interpretation of tests, patient visit, and documentation     Subjective   Esteban is a 64 year old, presenting for the following health issues:  Follow Up (REFILL)    Here today for follow-up.  Continues to improve.  Standing for longer periods of time is more tolerable.  Working closely with physical therapy.  Requesting additional extension of FMLA until 5/13/24 to allow him to continue.  Using gabapentin 100 mg daily.  Additionally, using oxycodone as needed for pain.  Requesting refill of this today.      Review of Systems  Constitutional, neuro, ENT, endocrine, pulmonary, cardiac, gastrointestinal, genitourinary, musculoskeletal, integument and psychiatric systems are negative, except as otherwise noted.      Objective           Vitals:  No vitals were obtained today due to virtual visit.    Physical Exam   GENERAL: alert and no distress  EYES: Eyes grossly normal to inspection.  No discharge or erythema, or obvious scleral/conjunctival abnormalities.  RESP: No audible wheeze, cough, or visible cyanosis.    SKIN: Visible skin clear. No significant rash, abnormal pigmentation or lesions.  NEURO: Cranial nerves grossly  intact.  Mentation and speech appropriate for age.  PSYCH: Appropriate affect, tone, and pace of words        Phone visit:     Type of service: Phone visit : 10 mins  Originating Location (pt. Location): Home    Distant Location (provider location):  On-site    The likelihood of other entities in the differential is insufficient to justify any further testing for them at this time. This was explained to the patient. The patient was advised that persistent or worsening symptoms would require further evaluation. Patient advised to call the office and if unable to reach to go to the emergency room if they develop any new or worsening symptoms. Expressed understanding and agreement with above stated plan.       Signed Electronically by: Jez Everett PA-C

## 2024-04-09 NOTE — LETTER
April 9, 2024      Esteban Nicholas  [address]   [place] [facility] [address]        To Whom It May Concern:    Esteban Nicholas  was seen on 4/9/24.  Please allow him extension of his FMLA until 5/13/24 due to injury.    Sincerely,      Jez Everett PA-C

## 2024-04-11 ENCOUNTER — THERAPY VISIT (OUTPATIENT)
Dept: PHYSICAL THERAPY | Facility: CLINIC | Age: 64
End: 2024-04-11
Payer: OTHER MISCELLANEOUS

## 2024-04-11 DIAGNOSIS — R26.9 ABNORMAL GAIT: ICD-10-CM

## 2024-04-11 DIAGNOSIS — M54.41 ACUTE RIGHT-SIDED LOW BACK PAIN WITH RIGHT-SIDED SCIATICA: Primary | ICD-10-CM

## 2024-04-11 PROCEDURE — 97530 THERAPEUTIC ACTIVITIES: CPT | Mod: GP | Performed by: PHYSICAL THERAPIST

## 2024-04-11 PROCEDURE — 97110 THERAPEUTIC EXERCISES: CPT | Mod: GP | Performed by: PHYSICAL THERAPIST

## 2024-04-11 NOTE — PROGRESS NOTES
04/11/24 0500   Appointment Info   Signing clinician's name / credentials Celia Milner, PT, DPT, OCS   Total/Authorized Visits 12+6 per PN today   Visits Used 11   Medical Diagnosis Acute right-sided low back pain with right-sided sciatica (M54.41)   PT Tx Diagnosis acute low back pain with radicular pain - lateral shift L   Other pertinent information workers comp   Progress Note/Certification   Onset of illness/injury or Date of Surgery 11/22/23  (initial DOI)   Therapy Frequency 1x/weekly, tapering to every other week   Predicted Duration add 2.5 months for conditioning   Progress Note Due Date 04/21/24   Progress Note Completed Date 02/22/24   GOALS   PT Goals 2   PT Goal 1   Goal Identifier Ambulation   Goal Description Patient will report ability to ambulate half the day at work with normalized gait, no AD, and no increase in pain to promote a healthy and active lifestyle as well as return to work fully.   Goal Progress tolerates 45 min of ambulation 2x/day, continuing to progress his tolerance each week   Target Date 07/01/24   PT Goal 2   Goal Identifier Lifting/bending   Goal Description Patient will demonstrate lifting 25# item from floor to waist height with hip hinge strategy and no increase in pain x 5 reps to indicate ability to complete household tasks such as dressing, groceries, laundry, and yardwork.   Goal Progress 20# x 10 reps with mild pain   Target Date 04/21/24   Subjective Report   Subjective Report Overall his pain is improving - less evident in the ankle but still consistent in the thigh/knee.Reports he is now ambulating 45 min 2x/day. Not interested in going up to [place] for work hardening; this would be difficult for his driving situation. See TE below for plan.   Objective Measures   Objective Measures Objective Measure 1;Objective Measure 2;Objective Measure 3;Objective Measure 4;Objective Measure 5   Objective Measure 1   Objective Measure Lumbar AROM   Details hinge with  extension at L2-3, restricted extension mod   Objective Measure 2   Objective Measure Radicular sx   Details No change to anterior thigh pain or knee pain with Praful's, femoral nerve tensioning, UPA/CPA mobs to entire lumbar spine.   Objective Measure 3   Objective Measure Hip   Details PROM IR R 35 deg, IR L 30 deg. PA mob gr 3 R hip provoked back pain but not thigh pain   Objective Measure 4   Objective Measure Knee screen   Details negative anterior/posterior drawer, negative varus/valgus stress testing, full AROM with no pain to overpressure, no TTP and no palable edema present. Suspect radicular sx.   Treatment Interventions (PT)   Interventions Therapeutic Procedure/Exercise;Gait Training;Manual Therapy;Therapeutic Activity   Therapeutic Procedure/Exercise   Therapeutic Procedures: strength, endurance, ROM, flexibility minutes (59682) 30   Therapeutic Procedures Ther Proc 2;Ther Proc 3;Ther Proc 4   Ther Proc 1 prone pressup with hips shifted to the left   Ther Proc 2 HEP/POC   Ther Proc 2 - Details Discussed consideration of work hardening or conditioning for increasing endurance with the purpose of returning to work. He is eager to return but cognizant that he would need to go back full time without restrictions and is wary that his progress would be hampered. We discussed getting a gym membership to increase the intensity of his HEP for the purpose of conditioning to the level needed to return to work. He is agreeable with this. 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   PTRx Ther Proc 1 Shoulder Press Overhead   PTRx Ther Proc 1 - Details squat and press with cues for power through LEs. x 15 x 10# DBs   PTRx Ther Proc 2 Dumbbell German Dead Lift - Bilateral   PTRx Ther Proc 2 - Details x 15 x 20# with cues for standing tall not hyper extending   PTRx Ther Proc 3 Pushups    PTRx Ther Proc 3 - Details elevated surface (plinth) x 10   PTRx Ther Proc 4 Heisman Step Ups   PTRx Ther Proc 4 - Details No Notes   PTRx Ther Proc 5 Standing Theraband I's   PTRx Ther Proc 5 - Details x 10 review green TB   PTRx Ther Proc 6 Lateral Trunk Stretch   PTRx Ther Proc 6 - Details HEP verbal review   PTRx Ther Proc 7 Supine Lumbar Hip Roll   PTRx Ther Proc 7 - Details HEP verbal review   Skilled Intervention lumbar mobility, LE strength   Patient Response/Progress appreciated adjusted ex   PTRx Ther Proc 8 Single Knee to Chest   PTRx Ther Proc 8 - Details HEP verbal review   PTRx Ther Proc 9 Supine Butterfly   PTRx Ther Proc 9 - Details HEP verbal review   PTRx Ther Proc 10 Paloff Press - Rotation Variation   PTRx Ther Proc 10 - Details HOLD for gym ex progression   PTRx Ther Proc 11 Seated Knee Extension With Theraband   PTRx Ther Proc 11 - Details HOLD for gym ex progression   Therapeutic Activity   Therapeutic Activities: dynamic activities to improve functional performance minutes (02953) 8   Therapeutic Activities Ther Act 3   Ther Act 1 Ambulation   Ther Act 2 Ambulation ed   Ther Act 2 - Details discussed pacing and increasing total time: Recommended he go on 2 walks each day 45-55 min each for a total of 80 min, add in 5 minute increments   Ther Act 3 work   Ther Act 3 - Details see TE above. 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   Patient Response/Progress engaged in conversation, agreeable to plan   Manual Therapy   Manual Therapy Manual Therapy 2;Manual Therapy 3;Manual Therapy 4;Manual Therapy 5;Manual Therapy 6;Manual Therapy 7;Manual Therapy 8   Manual Therapy 2 MET R anterior innom   Manual Therapy 3 MET pubic symph   Manual Therapy 4 Hip mobs   Manual Therapy 5 Lumbar mobs   Skilled Intervention targeting sx provocation and relief   Patient Response/Progress no provocation into anterior thigh   Education   Education Comments PTRx handouts   Plan   Home  program initiated, daily   Plan for next session HEP review, transfer work (crate at table), loaded rotation   Comments   Comments Recommend continued skilled PT in [employer] rather than delay to get work conditioning referral. Pt has made progress with ambulation endurance and function, but still scores in a moderate disability range.   Total Session Time   Timed Code Treatment Minutes 38   Total Treatment Time (sum of timed and untimed services) 38       PLAN  Continue therapy per current plan of care. Recommend continued skilled PT in [employer] rather than delay to get work conditioning referral. Pt has made progress with ambulation endurance and function, but still scores in a moderate disability range.    Beginning/End Dates of Progress Note Reporting Period:  02/22/24 to 04/11/2024    Referring Provider:  Jez Everett

## 2024-04-25 ENCOUNTER — THERAPY VISIT (OUTPATIENT)
Dept: PHYSICAL THERAPY | Facility: CLINIC | Age: 64
End: 2024-04-25
Payer: OTHER MISCELLANEOUS

## 2024-04-25 DIAGNOSIS — R26.9 ABNORMAL GAIT: ICD-10-CM

## 2024-04-25 DIAGNOSIS — M54.41 ACUTE RIGHT-SIDED LOW BACK PAIN WITH RIGHT-SIDED SCIATICA: Primary | ICD-10-CM

## 2024-04-25 PROCEDURE — 97032 APPL MODALITY 1+ESTIM EA 15: CPT | Mod: GP | Performed by: PHYSICAL THERAPIST

## 2024-04-25 PROCEDURE — 97110 THERAPEUTIC EXERCISES: CPT | Mod: GP | Performed by: PHYSICAL THERAPIST

## 2024-04-25 PROCEDURE — 97530 THERAPEUTIC ACTIVITIES: CPT | Mod: GP | Performed by: PHYSICAL THERAPIST

## 2024-05-09 ENCOUNTER — TELEPHONE (OUTPATIENT)
Dept: FAMILY MEDICINE | Facility: CLINIC | Age: 64
End: 2024-05-09

## 2024-05-09 ENCOUNTER — VIRTUAL VISIT (OUTPATIENT)
Dept: FAMILY MEDICINE | Facility: CLINIC | Age: 64
End: 2024-05-09
Payer: OTHER MISCELLANEOUS

## 2024-05-09 DIAGNOSIS — M54.41 ACUTE RIGHT-SIDED LOW BACK PAIN WITH RIGHT-SIDED SCIATICA: Primary | ICD-10-CM

## 2024-05-09 DIAGNOSIS — Z12.11 SCREEN FOR COLON CANCER: ICD-10-CM

## 2024-05-09 PROCEDURE — 99442 PR PHYSICIAN TELEPHONE EVALUATION 11-20 MIN: CPT | Mod: 93 | Performed by: PHYSICIAN ASSISTANT

## 2024-05-09 RX ORDER — OXYCODONE AND ACETAMINOPHEN 5; 325 MG/1; MG/1
1 TABLET ORAL EVERY 6 HOURS PRN
Qty: 15 TABLET | Refills: 0 | Status: SHIPPED | OUTPATIENT
Start: 2024-05-09 | End: 2024-05-22

## 2024-05-09 NOTE — PROGRESS NOTES
Esteban is a 64 year old who is being evaluated via a billable telephone visit.    What phone number would you like to be contacted at? [phone]  How would you like to obtain your AVS? Mail a copy  Originating Location (pt. Location): Home    Distant Location (provider location):  On-site    Assessment & Plan     Acute right-sided low back pain with right-sided sciatica  Refilled short medication for oxycodone-acetaminophen.  Really needs to use this sparingly.  Continue alternating Tylenol/ibuprofen as needed.  Continue to work with physical therapy as well as getting back to gym exercise.  Extension of FMLA until 6/6/2024.  W I ill fax to his company.  Close follow-up with new or worsening symptoms.  Once again offered spine evaluation which she declines today.  Discussed signs symptoms that warrant urgent/emergent reevaluation  - oxyCODONE-acetaminophen (PERCOCET) 5-325 MG tablet  Dispense: 15 tablet; Refill: 0    15 minutes spent by me on the date of the encounter doing chart review, review of test results, interpretation of tests, patient visit, and documentation       Subjective   Esteban is a 64 year old, presenting for the following health issues:  Follow Up and Health Maintenance (Colonoscopy- Hasn't had one done in last past years. )    Here today for follow-up in regards to ongoing right-sided lower back pain with right-sided sciatica.  Continues to work with physical therapy.  Back to the gym exercising.  Able to walk 45 minutes at a time twice daily.  After walking much further than that we will get tightening of his right lower leg as well as right knee.  Requesting extension of FMLA once again as well as a small refill on his oxycodone.  Really does try to use this medication very sparingly.  Continues with gabapentin as well as Tylenol/ibuprofen.    Review of Systems  Constitutional, neuro, ENT, endocrine, pulmonary, cardiac, gastrointestinal, genitourinary, musculoskeletal, integument and  psychiatric systems are negative, except as otherwise noted.      Objective    Vitals - Patient Reported  Pain Score: No Pain (0)      Vitals:  No vitals were obtained today due to virtual visit.    Physical Exam   General: Alert and no distress //Respiratory: No audible wheeze, cough, or shortness of breath // Psychiatric:  Appropriate affect, tone, and pace of words          Phone call duration: 10 minutes    The likelihood of other entities in the differential is insufficient to justify any further testing for them at this time. This was explained to the patient. The patient was advised that persistent or worsening symptoms would require further evaluation. Patient advised to call the office and if unable to reach to go to the emergency room if they develop any new or worsening symptoms. Expressed understanding and agreement with above stated plan.     Signed Electronically by: Jez Everett PA-C

## 2024-05-09 NOTE — TELEPHONE ENCOUNTER
Patient calling regarding upcoming appt - returning check in call.    Notified MA staff who will call patient back, patient is appreciative.    Sydney Chappell RN  [facility]

## 2024-05-09 NOTE — LETTER
May 9, 2024      Esteban Nicholas  [address]   [place] [address]        To Whom It May Concern:    Esteban Nicholas  was seen on 5/9/24.  Please excuse him until 6/6/24 due to injury.    Sincerely,    Jez Everett PA-C

## 2024-05-15 ENCOUNTER — THERAPY VISIT (OUTPATIENT)
Dept: PHYSICAL THERAPY | Facility: CLINIC | Age: 64
End: 2024-05-15
Payer: OTHER MISCELLANEOUS

## 2024-05-15 DIAGNOSIS — R26.9 ABNORMAL GAIT: ICD-10-CM

## 2024-05-15 DIAGNOSIS — M54.41 ACUTE RIGHT-SIDED LOW BACK PAIN WITH RIGHT-SIDED SCIATICA: Primary | ICD-10-CM

## 2024-05-15 PROCEDURE — 97530 THERAPEUTIC ACTIVITIES: CPT | Mod: GP | Performed by: PHYSICAL THERAPIST

## 2024-05-15 PROCEDURE — 97110 THERAPEUTIC EXERCISES: CPT | Mod: GP | Performed by: PHYSICAL THERAPIST

## 2024-05-22 DIAGNOSIS — M54.41 ACUTE RIGHT-SIDED LOW BACK PAIN WITH RIGHT-SIDED SCIATICA: ICD-10-CM

## 2024-05-22 RX ORDER — OXYCODONE AND ACETAMINOPHEN 5; 325 MG/1; MG/1
1 TABLET ORAL EVERY 6 HOURS PRN
Qty: 15 TABLET | Refills: 0 | Status: SHIPPED | OUTPATIENT
Start: 2024-05-22 | End: 2024-06-04

## 2024-05-23 NOTE — TELEPHONE ENCOUNTER
Spoke with patient and relayed PCP's message below.  Patient stated he is already going to PT.  Pt did take phone number for spine specialist for an evaluation.      Pt stated understanding to be cautious with medication.    Gladys Maldonado on 5/23/2024 at 1:34 PM

## 2024-05-23 NOTE — TELEPHONE ENCOUNTER
Patient Contact    Attempt # 1    Was call answered?  No.  Unable to leave message. Mailbox full.    Upon call back, please relay provider note below and provide pt with the following referral numbers:  Spine: [phone]  PT: [phone]     Thank you,  Michell Khan, Jez Barry PA-C   to [facility] Triage Im   MG      5/22/24  5:00 PM  Refilled for him. Can we encourage caution/limiting this med. Encourage spine eval which we have discussed - referral is in. PT is in agreement. Let's get him the number to schedule.

## 2024-06-04 ENCOUNTER — VIRTUAL VISIT (OUTPATIENT)
Dept: FAMILY MEDICINE | Facility: CLINIC | Age: 64
End: 2024-06-04
Payer: OTHER MISCELLANEOUS

## 2024-06-04 DIAGNOSIS — M25.561 RIGHT KNEE PAIN, UNSPECIFIED CHRONICITY: ICD-10-CM

## 2024-06-04 DIAGNOSIS — M54.41 ACUTE RIGHT-SIDED LOW BACK PAIN WITH RIGHT-SIDED SCIATICA: ICD-10-CM

## 2024-06-04 DIAGNOSIS — S46.211D STRAIN OF RIGHT BICEPS MUSCLE, SUBSEQUENT ENCOUNTER: Primary | ICD-10-CM

## 2024-06-04 PROCEDURE — 99442 PR PHYSICIAN TELEPHONE EVALUATION 11-20 MIN: CPT | Mod: 93 | Performed by: PHYSICIAN ASSISTANT

## 2024-06-04 RX ORDER — OXYCODONE AND ACETAMINOPHEN 5; 325 MG/1; MG/1
1 TABLET ORAL EVERY 6 HOURS PRN
Qty: 10 TABLET | Refills: 0 | Status: SHIPPED | OUTPATIENT
Start: 2024-06-04 | End: 2024-06-18

## 2024-06-04 RX ORDER — GABAPENTIN 100 MG/1
200 CAPSULE ORAL AT BEDTIME
Qty: 90 CAPSULE | Refills: 1 | Status: SHIPPED | OUTPATIENT
Start: 2024-06-04

## 2024-06-04 NOTE — PROGRESS NOTES
Esteban is a 64 year old who is being evaluated via a billable telephone visit.    What phone number would you like to be contacted at? [phone]  How would you like to obtain your AVS? Edith  Originating Location (pt. Location): Home    Distant Location (provider location):  On-site    Assessment & Plan     Acute right-sided low back pain with right-sided sciatica  Increase gabapentin to 200 mg nightly.  Short prescription of the oxycodone signed.  Really needs to limit this.  Close follow-up with new or worsening symptoms.  - oxyCODONE-acetaminophen (PERCOCET) 5-325 MG tablet  Dispense: 10 tablet; Refill: 0  - gabapentin (NEURONTIN) 100 MG capsule  Dispense: 90 capsule; Refill: 1        12 minutes spent by me on the date of the encounter doing chart review, review of test results, interpretation of tests, patient visit, and documentation     Subjective   Esteban is a 64 year old, presenting for the following health issues:  Follow Up, Muscle Pain (Spasms, legs and thigh), Recheck Medication, and Refill Request    Here today requesting extension of his FMLA.  Continued right lower extremity pain, spasms.  Anterior leg especially when working out.  Fortunately, does have a upcoming spine evaluation.  Myself and his physical therapist have been encouraging this for a while.  Continuing to work with physical therapy.  Making progress however has recently had some setbacks.  Denies loss of bowel or bladder.  No numbness in the groin region.          6/4/2024     3:32 PM   Additional Questions   Roomed by Elham WORLEY       Review of Systems  Constitutional, neuro, ENT, endocrine, pulmonary, cardiac, gastrointestinal, genitourinary, musculoskeletal, integument and psychiatric systems are negative, except as otherwise noted.      Objective         Vitals:  No vitals were obtained today due to virtual visit.    Physical Exam   General: Alert and no distress //Respiratory: No audible wheeze, cough, or shortness of  breath // Psychiatric:  Appropriate affect, tone, and pace of words            Phone call duration: 12 minutes    The likelihood of other entities in the differential is insufficient to justify any further testing for them at this time. This was explained to the patient. The patient was advised that persistent or worsening symptoms would require further evaluation. Patient advised to call the office and if unable to reach to go to the emergency room if they develop any new or worsening symptoms. Expressed understanding and agreement with above stated plan.     Signed Electronically by: Jez Everett PA-C

## 2024-06-05 ENCOUNTER — THERAPY VISIT (OUTPATIENT)
Dept: PHYSICAL THERAPY | Facility: CLINIC | Age: 64
End: 2024-06-05
Payer: OTHER MISCELLANEOUS

## 2024-06-05 DIAGNOSIS — R26.9 ABNORMAL GAIT: ICD-10-CM

## 2024-06-05 DIAGNOSIS — M54.41 ACUTE RIGHT-SIDED LOW BACK PAIN WITH RIGHT-SIDED SCIATICA: Primary | ICD-10-CM

## 2024-06-05 PROCEDURE — 97110 THERAPEUTIC EXERCISES: CPT | Mod: GP | Performed by: PHYSICAL THERAPIST

## 2024-06-05 NOTE — PROGRESS NOTES
06/05/24 0500   Appointment Info   Signing clinician's name / credentials Celia Milner, PT, DPT, OCS / Yanelis Holguin, SPT   Total/Authorized Visits 18   Visits Used 14   Medical Diagnosis Acute right-sided low back pain with right-sided sciatica (M54.41)   PT Tx Diagnosis acute low back pain with radicular pain - lateral shift L   Other pertinent information workers comp   Quick Adds Student Supervision   Progress Note/Certification   Onset of illness/injury or Date of Surgery 11/22/23  (initial DOI)   Therapy Frequency 1x/weekly, tapering to every other week   Predicted Duration add 2.5 months for conditioning   Progress Note Due Date 06/09/24   Progress Note Completed Date 04/11/24   Supervision   Student Supervision Other (see comments)  (shadowing)   GOALS   PT Goals 2   PT Goal 1   Goal Identifier Ambulation   Goal Description Patient will report ability to ambulate half the day at work with normalized gait, no AD, and no increase in pain to promote a healthy and active lifestyle as well as return to work fully.   Goal Progress tolerates 45 min of ambulation 2x/day, continuing to progress his tolerance each week - unable to progress due to thigh spams   Target Date 07/01/24   PT Goal 2   Goal Identifier Lifting/bending   Goal Description Patient will demonstrate lifting 25# item from floor to waist height with hip hinge strategy and no increase in pain x 5 reps to indicate ability to complete household tasks such as dressing, groceries, laundry, and yardwork.   Goal Progress 70# no pain x 15 reps   Target Date 04/21/24   Date Met 06/05/24   Subjective Report   Subjective Report Has a spine consult 6/18. Having leg spasms for 1 hour after working out and walking 45 min, which he is managing with heat. HEP 3x/week.   Objective Measures   Objective Measures Objective Measure 1;Objective Measure 2;Objective Measure 3;Objective Measure 4;Objective Measure 5   Objective Measure 1   Objective Measure Lumbar AROM    Details hinge with extension at L2-3, restricted extension mod   Objective Measure 2   Objective Measure Radicular sx   Details No change to anterior thigh pain or knee pain with Praful's, femoral nerve tensioning, UPA/CPA mobs to entire lumbar spine.   Objective Measure 3   Objective Measure Hip   Details PROM IR R 35 deg, IR L 30 deg. PA mob gr 3 R hip provoked back pain but not thigh pain   Objective Measure 4   Objective Measure Knee screen   Details negative anterior/posterior drawer, negative varus/valgus stress testing, full AROM with no pain to overpressure, no TTP and no palable edema present. Suspect radicular sx.   Objective Measure 5   Objective Measure Gait   Details more antalgic today, short stance L   PT Modalities   PT Modalities Electrical Stimulation   Electrical Stimulation   Electrical Stim -Type TENS   Intensity 15   Duration 10 min   Frequency/Pattern Premod   Location L3-5 midline   Patient Response/Progress trialed but electrodes wouldn't stick so held for today   Treatment Interventions (PT)   Interventions Therapeutic Procedure/Exercise;Gait Training;Manual Therapy;Therapeutic Activity   Therapeutic Procedure/Exercise   Therapeutic Procedures: strength, endurance, ROM, flexibility minutes (97243) 40   Therapeutic Procedures Ther Proc 2;Ther Proc 3;Ther Proc 4   Ther Proc 1 prone pressup with hips shifted to the left   Ther Proc 2 HEP/POC   Ther Proc 2 - Details Work hardening/transfer skills type focus   Ther Proc 3 Nustep   Ther Proc 3 - Details x 10 min lvl 4-7 intervals for endurance   PTRx Ther Proc 1 Shoulder Press Overhead   PTRx Ther Proc 1 - Details squat and press with cues for power through LEs. x 15 x 10# DBs   PTRx Ther Proc 2 Dumbbell [place] Dead Lift - Bilateral   PTRx Ther Proc 2 - Details x 15 x 20# with cues for standing tall not hyper extending   PTRx Ther Proc 3 Pushups   PTRx Ther Proc 3 - Details elevated surface (plinth) x 10   PTRx Ther Proc 4 Heisman Step Ups    PTRx Ther Proc 4 - Details No Notes   PTRx Ther Proc 5 Standing Theraband I's   PTRx Ther Proc 5 - Details x 10 review green TB   PTRx Ther Proc 6 Lateral Trunk Stretch   PTRx Ther Proc 6 - Details HEP verbal review   PTRx Ther Proc 7 Supine Lumbar Hip Roll   PTRx Ther Proc 7 - Details HEP verbal review   PTRx Ther Proc 8 Single Knee to Chest   PTRx Ther Proc 8 - Details HEP verbal review   PTRx Ther Proc 9 Supine Butterfly   PTRx Ther Proc 9 - Details HEP verbal review   PTRx Ther Proc 10 Paloff Press - Rotation Variation   PTRx Ther Proc 10 - Details HOLD for gym ex progression   PTRx Ther Proc 11 Seated Knee Extension With Theraband   PTRx Ther Proc 11 - Details HOLD for gym ex progression   Skilled Intervention lumbar mobility, LE strength   Patient Response/Progress appreciated adjusted ex   PTRx Ther Proc 12 Standing Quadricep Stretch   PTRx Ther Proc 12 - Details 1 x 30 seconds BLE   PTRx Ther Proc 13 Standing Hamstring Stretch   PTRx Ther Proc 13 - Details 1 x 30 seconds BLE   PTRx Ther Proc 14 Standing Gastroc Stretch   PTRx Ther Proc 14 - Details 1 x 30 seconds BLE   Therapeutic Activity   Therapeutic Activities Ther Act 3   Ther Act 1 Transfers   Ther Act 1 - Details CNA type transfers: side step with crate slide on table 10-70#, fwd/back slide (simulate rolling a patient) 50-70#   Ther Act 2 Ambulation ed   Ther Act 3 work   Skilled Intervention Ed on weight shifting more than using UE - tactile cues needed for fwd/back slides   Patient Response/Progress no pain, difficulty with AP weight shifts   Manual Therapy   Manual Therapy Manual Therapy 2;Manual Therapy 3;Manual Therapy 4;Manual Therapy 5;Manual Therapy 6;Manual Therapy 7;Manual Therapy 8   Manual Therapy 2 MET R anterior innom   Manual Therapy 3 MET pubic symph   Manual Therapy 4 Hip mobs   Manual Therapy 5 Lumbar mobs   Skilled Intervention targeting sx provocation and relief   Patient Response/Progress no provocation into anterior thigh    Education   Education Comments PTRx handouts   Plan   Home program initiated, daily   Plan for next session transfer work, medball catches (reactive stability), messy lifting   Comments   Comments Recommend continued skilled PT in [place] rather than delay to get work conditioning referral. Pt has made progress with ambulation endurance and function, but still scores in a moderate disability range.   Total Session Time   Timed Code Treatment Minutes 40   Total Treatment Time (sum of timed and untimed services) 40       PLAN  Continue therapy per current plan of care.  Pt to see neurosurgery this month. Will update following this.    Beginning/End Dates of Progress Note Reporting Period:  04/11/24 to 06/05/2024    Referring Provider:  Jez Everett

## 2024-06-06 NOTE — PROGRESS NOTES
"    SUBJECTIVE:  HPI:  Esteban Nicholas  Is a 64 year old male who presents for new patient evaluation of acute right low back pain despite physical therapy.  Reported right lower extremity pain and spasms.  No bowel or bladder dysfunction.  No groin numbness.  Referred by ROSS Everett who increase gabapentin to 200 mg at night and gave her a short course of Percocet 5.    12/10/2023 ED note records:  \"Esteban Nicholas is a 63 year old male who presents with sharp right low back radiating down his right leg. 2 weeks ago patient was moving a patient at work and the day after he began to have pain and difficulty moving his left arm secondary to pain (this is since resolved). A week after he developed right lower back pain and was seen for this. He was given Medrol Dosepak which did not resolve his pain. His pain began to radiate down his right leg down to his ankle. 5 days ago he was had a negative lower extremity ultrasound. Here in the ED he denies numbness and recent falls or injuries.  Continues to have pain that radiates down his right leg.\"  He had a negative tib-fib x-ray.  There is no evidence of DVT.  There is no indication for an MRI.  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  The history that he gives me today is different.  He said that he was assisting another coworker to boost a patient up in bed by grabbing onto the sheet and twisting to his left.  Immediately he felt a little bit of pain in his lateral left elbow.  The next day he said his left arm was \"paralyzed\" and it stayed that way for 2 to 3 days and then it started moving again.  He then tells me that 2 days later he started noticing right-sided low back pain pointing to the lateral iliac crest and it was so bad that he went to the emergency room with the history as they described above.  He went to the emergency room as " above.  The pain has disappeared.  He no longer has any back pain.  The reason he is here today is because he has a sensation that his thigh and knee tightens up when he goes for a walk and that is been ongoing for 1-1/2 months.  His left elbow aches a little when he lifts heavy things.  He has not returned to work since the injury.    He has an LUISA for this scheduled for 6/20/2024    Pain score and diagram reviewed.  See questionnaire.      ROS: .  Otherwise negative for bowel/bladder dysfunction, balance changes, headache, leg pain/numbness/weakness, fevers, chills, night sweats, unexplained weight loss;  otherwise unremarkable.   See the patient's intake questionnaire from today for details.        MEDICATIONS:  Reviewed.    ALLERGIES:  Reviewed.     PAST MEDICAL/SURGICAL HISTORY:   Nothing pertinent.  There is a history of left leg edema but no history of any right leg problems.  He denies any prior back problems.  He denies any prior left elbow problems.    SOCIAL HX: He has decided not to go back to work because he cannot stand for a long period of time and does not feel he can do his job.  To his knowledge he has not been kept from work by his physicians.  He is working as a nursing assistant at a nursing home where he has worked for 5 to 6 years.  He is originally from [place] but his English is fluent and there is no language barrier.      OBJECTIVE:    IMAGING: Images and reports reviewed    LUMBAR SPINE 2/3 VIEWS 12/20/2023                                                                IMPRESSION: 5 lumbar type vertebrae. Normal alignment. Vertebral body  heights normal. No fractures. Facet arthropathy at all levels of the  lumbar spine to varying degrees.    EXAMINATION:    --CONSTITUTIONAL:   No acute distress.  The patient is well nourished and well groomed.  Mesomorphic build with elevated BMI.  --SKIN:  Skin over the face, bilateral lower extremities, and posterior torso is clean, dry, intact without  milton.  I question whether some slight nonpitting edema is in his lower extremities but it is nothing overt  --GAIT:  is non-antalgic. Flat foot, heel and toe walking:  normal   .  Squat and rise   normal    .  --STANDING EXAMINATION:    Symmetry of spine/pelvis   unremarkable   .      Range of motion full with a little bit of low back pain only on the endpoint of flexion..   Rebecca's sign   negative    .       --NEUROLOGICAL:    SENSATION to light touch is intact in bilateral thighs, lower legs and feet.   REFLEXES:  patellar 1+, and achilles absent.  Babinski is negative. No clonus.  MANUAL MOTOR TESTING:  L1- S1 Myotomes, Femoral, Obturator, Peroneal and Tibial nerves 5/5 and painless  DURAL STRETCH TESTS:  SLR negative.   --PELVIC/HIP JOINTS:                  Spring testing negative SI and lumbar.      PELVIC ALIGNMENT neutral.   --LUMBAR/GLUTEAL MUSCLES: Nontender.    Right knee: Negative patellofemoral grind testing.  No tenderness to palpation.  Negative effusion.  Cruciates and collaterals negative.  Bounce home test negative.  Joint lines nontender.    Left elbow: No tenderness over the lateral and medial epicondyle or bicipital tendon.  No Caio or reverse Caio deformity.  Distal neurovascular is intact.  No pain with resisted elbow flexion, extension, pronation, or supination.      ASSESSMENT: Esteban Nicholas is a 64 year old male who presents  today for new patient evaluation of:    Work comp injury dated end of November 2023  There is not a clear relationship between the mechanism of injury and the onset of symptoms and the history is a little bit inconsistent.  Left elbow exam is normal.  The low back exam is normal with the exception of very mild pain at the endpoint of flexion.  His main complaint now is a month and a half's worth of right knee and thigh stiffness with no clear correlation to the injury.  He has elected not to return to work since the date of injury      PLAN:  He has an LUISA  scheduled in 2 days.  I do not think there is a back injury or a biceps tendon/elbow injury that requires treatment or restrictions.  I did recommend that he follow-up with his family doctor regarding a right knee condition starting with an x-ray and possibly with an MRI.    Advised patient to call or return early if symptoms worsen, or having problems controlling bladder and bowel function or worsening leg weakness.     Please note: Voice recognition software was used in this dictation.  It may therefore contain typographical errors.    Crow Butterfield MD

## 2024-06-18 ENCOUNTER — OFFICE VISIT (OUTPATIENT)
Dept: NEUROSURGERY | Facility: CLINIC | Age: 64
End: 2024-06-18
Attending: PHYSICIAN ASSISTANT
Payer: OTHER MISCELLANEOUS

## 2024-06-18 VITALS
HEART RATE: 72 BPM | HEIGHT: 66 IN | BODY MASS INDEX: 38.57 KG/M2 | DIASTOLIC BLOOD PRESSURE: 93 MMHG | WEIGHT: 240 LBS | SYSTOLIC BLOOD PRESSURE: 162 MMHG

## 2024-06-18 DIAGNOSIS — M54.41 ACUTE RIGHT-SIDED LOW BACK PAIN WITH RIGHT-SIDED SCIATICA: ICD-10-CM

## 2024-06-18 PROCEDURE — 99204 OFFICE O/P NEW MOD 45 MIN: CPT | Performed by: PREVENTIVE MEDICINE

## 2024-06-18 RX ORDER — OXYCODONE AND ACETAMINOPHEN 5; 325 MG/1; MG/1
1 TABLET ORAL EVERY 6 HOURS PRN
Qty: 10 TABLET | Refills: 0 | Status: SHIPPED | OUTPATIENT
Start: 2024-06-18 | End: 2024-07-04

## 2024-06-18 NOTE — TELEPHONE ENCOUNTER
Medication Refill    What medication are you calling about (include dose and sig)?:   oxyCODONE-acetaminophen (PERCOCET) 5-325 MG tablet     Preferred Pharmacy:  [id number] - [place] - [address] AT Re[address] by [facility] [address]  [address]  [place] [address]  Phone: [phone] Fax: [phone]    Controlled Substance Agreement on file:   CSA -- Patient Level:    CSA: None found at the patient level.       Who prescribed the medication?: Jez Everett PA-C     Do you need a refill? Yes, OUT OF MEDICATION    Patient offered an appointment? Yes, declined    Do you have any questions or concerns?  No    Okay to leave a detailed message?: Yes at Cell number on file:    Telephone Information:   Mobile [phone]     Gladys Maldonado on 6/18/2024 at 1:22 PM

## 2024-06-18 NOTE — LETTER
"6/18/2024      Esteban Nicholas  [address] [facility] [address] [place] [id number]      Dear Colleague,    Thank you for referring your patient, Esteban Nicholas, to the [facility] NEUROSURGERY [facility]INIC [place]. Please see a copy of my visit note below.        SUBJECTIVE:  HPI:  Esteban Nicholas  Is a 64 year old male who presents for new patient evaluation of acute right low back pain despite physical therapy.  Reported right lower extremity pain and spasms.  No bowel or bladder dysfunction.  No groin numbness.  Referred by ROSS Everett who increase gabapentin to 200 mg at night and gave her a short course of Percocet 5.    12/10/2023 ED note records:  \"Esteban Nicholas is a 63 year old male who presents with sharp right low back radiating down his right leg. 2 weeks ago patient was moving a patient at work and the day after he began to have pain and difficulty moving his left arm secondary to pain (this is since resolved). A week after he developed right lower back pain and was seen for this. He was given Medrol Dosepak which did not resolve his pain. His pain began to radiate down his right leg down to his ankle. 5 days ago he was had a negative lower extremity ultrasound. Here in the ED he denies numbness and recent falls or injuries.  Continues to have pain that radiates down his right leg.\"  He had a negative tib-fib x-ray.  There is no evidence of DVT.  There is no indication for an MRI.  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  The history that he gives me today is different.  He said that he was assisting another coworker to boost a patient up in bed by grabbing onto the sheet and twisting to his left.  Immediately he felt a little bit of pain in his lateral left elbow.  The next day he said his left arm was \"paralyzed\" and it stayed that way for 2 to 3 days and then it started " moving again.  He then tells me that 2 days later he started noticing right-sided low back pain pointing to the lateral iliac crest and it was so bad that he went to the emergency room with the history as they described above.  He went to the emergency room as above.  The pain has disappeared.  He no longer has any back pain.  The reason he is here today is because he has a sensation that his thigh and knee tightens up when he goes for a walk and that is been ongoing for 1-1/2 months.  His left elbow aches a little when he lifts heavy things.  He has not returned to work since the injury.    He has an LUISA for this scheduled for 6/20/2024    Pain score and diagram reviewed.  See questionnaire.      ROS: .  Otherwise negative for bowel/bladder dysfunction, balance changes, headache, leg pain/numbness/weakness, fevers, chills, night sweats, unexplained weight loss;  otherwise unremarkable.   See the patient's intake questionnaire from today for details.        MEDICATIONS:  Reviewed.    ALLERGIES:  Reviewed.     PAST MEDICAL/SURGICAL HISTORY:   Nothing pertinent.  There is a history of left leg edema but no history of any right leg problems.  He denies any prior back problems.  He denies any prior left elbow problems.    SOCIAL HX: He has decided not to go back to work because he cannot stand for a long period of time and does not feel he can do his job.  To his knowledge he has not been kept from work by his physicians.  He is working as a nursing assistant at a nursing home where he has worked for 5 to 6 years.  He is originally from [place] but his English is fluent and there is no language barrier.      OBJECTIVE:    IMAGING: Images and reports reviewed    LUMBAR SPINE 2/3 VIEWS 12/20/2023                                                                IMPRESSION: 5 lumbar type vertebrae. Normal alignment. Vertebral body  heights normal. No fractures. Facet arthropathy at all levels of the  lumbar spine to varying  degrees.    EXAMINATION:    --CONSTITUTIONAL:   No acute distress.  The patient is well nourished and well groomed.  Mesomorphic build with elevated BMI.  --SKIN:  Skin over the face, bilateral lower extremities, and posterior torso is clean, dry, intact without rashes.  I question whether some slight nonpitting edema is in his lower extremities but it is nothing overt  --GAIT:  is non-antalgic. Flat foot, heel and toe walking:  normal   .  Squat and rise   normal    .  --STANDING EXAMINATION:    Symmetry of spine/pelvis   unremarkable   .      Range of motion full with a little bit of low back pain only on the endpoint of flexion..   Rebecca's sign   negative    .       --NEUROLOGICAL:    SENSATION to light touch is intact in bilateral thighs, lower legs and feet.   REFLEXES:  patellar 1+, and achilles absent.  Babinski is negative. No clonus.  MANUAL MOTOR TESTING:  L1- S1 Myotomes, Femoral, Obturator, Peroneal and Tibial nerves 5/5 and painless  DURAL STRETCH TESTS:  SLR negative.   --PELVIC/HIP JOINTS:                  Spring testing negative SI and lumbar.      PELVIC ALIGNMENT neutral.   --LUMBAR/GLUTEAL MUSCLES: Nontender.    Right knee: Negative patellofemoral grind testing.  No tenderness to palpation.  Negative effusion.  Cruciates and collaterals negative.  Bounce home test negative.  Joint lines nontender.    Left elbow: No tenderness over the lateral and medial epicondyle or bicipital tendon.  No Caio or reverse Caio deformity.  Distal neurovascular is intact.  No pain with resisted elbow flexion, extension, pronation, or supination.      ASSESSMENT: Esteban Nicholas is a 64 year old male who presents  today for new patient evaluation of:    Work comp injury dated end of November 2023  There is not a clear relationship between the mechanism of injury and the onset of symptoms and the history is a little bit inconsistent.  Left elbow exam is normal.  The low back exam is normal with the exception of  very mild pain at the endpoint of flexion.  His main complaint now is a month and a half's worth of right knee and thigh stiffness with no clear correlation to the injury.  He has elected not to return to work since the date of injury      PLAN:  He has an LUISA scheduled in 2 days.  I do not think there is a back injury or a biceps tendon/elbow injury that requires treatment or restrictions.  I did recommend that he follow-up with his family doctor regarding a right knee condition starting with an x-ray and possibly with an MRI.    Advised patient to call or return early if symptoms worsen, or having problems controlling bladder and bowel function or worsening leg weakness.     Please note: Voice recognition software was used in this dictation.  It may therefore contain typographical errors.    Crow Butterfield MD             Again, thank you for allowing me to participate in the care of your patient.        Sincerely,        Crow Butterfield MD

## 2024-06-18 NOTE — NURSING NOTE
"Reason For Visit:   Chief Complaint   Patient presents with    New Patient     Acute right-sided low back pain with right-sided sciatica, Dr. Everett, WC,   xrays in [place]            Occupation: Trained MA /CNA  Currently working? No.  Work status?  On disability.    Sports: no   Activities: no               BP (!) 162/93 (BP Location: Right arm, Patient Position: Sitting, Cuff Size: Adult Large)   Pulse 72   Ht 1.676 m (5' 6\")   Wt 108.9 kg (240 lb)   BMI 38.74 kg/m        No Known Allergies    Current Outpatient Medications   Medication Sig Dispense Refill    gabapentin (NEURONTIN) 100 MG capsule Take 2 capsules (200 mg) by mouth at bedtime 90 capsule 1    ibuprofen (ADVIL/MOTRIN) 200 MG tablet Take 200 mg by mouth every 4 hours as needed for pain      oxyCODONE-acetaminophen (PERCOCET) 5-325 MG tablet Take 1 tablet by mouth every 6 hours as needed for severe pain 10 tablet 0     No current facility-administered medications for this visit.         Sal Walter MA  '  "

## 2024-06-18 NOTE — PATIENT INSTRUCTIONS
Esteban it was nice to meet you.  I think your back is good.  I do not see a problem in your back that is causing your knee or leg condition on the right but I do think you have a separate problem in your knee and I will have you talk to your doctor about working that up.

## 2024-06-26 ENCOUNTER — THERAPY VISIT (OUTPATIENT)
Dept: PHYSICAL THERAPY | Facility: CLINIC | Age: 64
End: 2024-06-26
Payer: OTHER MISCELLANEOUS

## 2024-06-26 DIAGNOSIS — R26.9 ABNORMAL GAIT: ICD-10-CM

## 2024-06-26 DIAGNOSIS — M54.41 ACUTE RIGHT-SIDED LOW BACK PAIN WITH RIGHT-SIDED SCIATICA: Primary | ICD-10-CM

## 2024-06-26 PROCEDURE — 97110 THERAPEUTIC EXERCISES: CPT | Mod: GP | Performed by: PHYSICAL THERAPIST

## 2024-07-03 ENCOUNTER — ANCILLARY PROCEDURE (OUTPATIENT)
Dept: GENERAL RADIOLOGY | Facility: CLINIC | Age: 64
End: 2024-07-03
Attending: PHYSICIAN ASSISTANT
Payer: OTHER MISCELLANEOUS

## 2024-07-03 ENCOUNTER — OFFICE VISIT (OUTPATIENT)
Dept: FAMILY MEDICINE | Facility: CLINIC | Age: 64
End: 2024-07-03
Payer: OTHER MISCELLANEOUS

## 2024-07-03 VITALS
HEIGHT: 66 IN | DIASTOLIC BLOOD PRESSURE: 70 MMHG | SYSTOLIC BLOOD PRESSURE: 118 MMHG | TEMPERATURE: 97.4 F | WEIGHT: 242 LBS | OXYGEN SATURATION: 95 % | BODY MASS INDEX: 38.89 KG/M2 | RESPIRATION RATE: 20 BRPM | HEART RATE: 80 BPM

## 2024-07-03 DIAGNOSIS — M79.661 PAIN OF RIGHT LOWER LEG: ICD-10-CM

## 2024-07-03 DIAGNOSIS — M54.41 ACUTE RIGHT-SIDED LOW BACK PAIN WITH RIGHT-SIDED SCIATICA: Primary | ICD-10-CM

## 2024-07-03 PROCEDURE — 73562 X-RAY EXAM OF KNEE 3: CPT | Mod: TC | Performed by: RADIOLOGY

## 2024-07-03 PROCEDURE — 99214 OFFICE O/P EST MOD 30 MIN: CPT | Performed by: PHYSICIAN ASSISTANT

## 2024-07-03 PROCEDURE — 73552 X-RAY EXAM OF FEMUR 2/>: CPT | Mod: TC | Performed by: RADIOLOGY

## 2024-07-03 ASSESSMENT — PAIN SCALES - GENERAL: PAINLEVEL: NO PAIN (0)

## 2024-07-03 NOTE — LETTER
July 5, 2024      Esteban VAISHALI Nicholas  [address] [address]        Dear ,    We are writing to inform you of your test results.    Femur and Knee Xrays both look good. No issues there. Reassuring.     Resulted Orders   XR Femur Right 2 Views    Narrative    EXAM: XR FEMUR RIGHT 2 VIEWS  LOCATION: [facility]  DATE: 7/3/2024    INDICATION:  Pain of right lower leg  COMPARISON: None.      Impression    IMPRESSION: Within normal limits. No fracture.       If you have any questions or concerns, please call the clinic at the number listed above.       Sincerely,      Jez Everett PA-C

## 2024-07-03 NOTE — LETTER
July 3, 2024      Esteban Nicholas  [address] [facility]   [facility] [address]        To Whom It May Concern:    Esteban Nicholas  was seen on 7/3/24.  Please excuse him until 8/4/24 due to injury.      Sincerely,      Jez Everett PA-C

## 2024-07-03 NOTE — PROGRESS NOTES
"Assessment & Plan     Acute right-sided low back pain with right-sided sciatica  Pain of right lower leg  XR right knee/femur - needs dedicated imaging. Care coordination to help navigate possible disability etc. Continue Gabapentin. Ideally avoid opioids. PE unremarkable today however based on description of symptoms unable to work at this time.   - Primary Care - Care Coordination Referral  - XR Femur Right 2 Views  - XR Knee Right 3 Views      30 minutes spent by me on the date of the encounter on chart review, review of test results, interpretation of tests, patient visit, and documentation       BMI  Estimated body mass index is 39.06 kg/m  as calculated from the following:    Height as of this encounter: 1.676 m (5' 6\").    Weight as of this encounter: 109.8 kg (242 lb).   Weight management plan: Discussed healthy diet and exercise guidelines      No follow-ups on file.    The likelihood of other entities in the differential is insufficient to justify any further testing for them at this time. This was explained to the patient. The patient was advised that persistent or worsening symptoms would require further evaluation. Patient advised to call the office and if unable to reach to go to the emergency room if they develop any new or worsening symptoms. Expressed understanding and agreement with above stated plan.     MARTINEZ Atkins [facility] DIANNA Nicholas is a 64 year old male presenting for the following health issues:  Patient presents with:  Follow Up: Patient is here for a one month follow up with provider.    Here for follow-up in-person.     On-going RLE pain, tightness, weakness with ambulation/standing for prolonged periods of time. Associated with right sided lower back pain. Evaluated by neurosurgery + PT. Continues PT. Making progress. Continues to be out of work due to this since December. Awaiting results of work comp outside evaluation. " "Hopefully determining if he can go back to work or needs to seek disability. Navigating this currently. Continues on Gabapentin.       Review of Systems   Constitutional, HEENT, cardiovascular, pulmonary, GI, , musculoskeletal, neuro, skin, endocrine and psych systems are negative, except as otherwise noted.      Objective    /70   Pulse 80   Temp 97.4  F (36.3  C) (Temporal)   Resp 20   Ht 1.676 m (5' 6\")   Wt 109.8 kg (242 lb)   SpO2 95%   BMI 39.06 kg/m    5' 6\"  242 lbs 0 oz    Physical Exam   GENERAL: healthy, alert and no distress  EYES: Eyes grossly normal to inspection, PERRL and conjunctivae and sclerae normal  NECK: no adenopathy, no asymmetry, masses, or scars and thyroid normal to palpation  RESP: lungs clear to auscultation - no rales, rhonchi or wheezes  CV: regular rate and rhythm, normal S1 S2, no S3 or S4, no murmur, click or rub, no peripheral edema and peripheral pulses strong  MS: no gross musculoskeletal defects noted, no edema  SKIN: no suspicious lesions or rashes  NEURO: Cranial nerves 2-12 grossly intact. LE strength and tone is intact as well as sensation, mentation intact and speech normal. Gait stable.   PSYCH: mentation appears normal, affect normal/bright      Answers submitted by the patient for this visit:  Back Pain Visit Questionnaire (Submitted on 7/3/2024)  Your back pain is: recurring  Chronic or Recurring Back Pain Visit Questionnaire (Submitted on 7/3/2024)  Where is your back pain located? : left lower back  How would you describe your back pain? : dull ache  Where does your back pain spread? : right thigh  Since you noticed your back pain, how has it changed? : gradually improving  Does your back pain interfere with your job?: Not applicable  General Questionnaire (Submitted on 7/3/2024)  Chief Complaint: Chronic problems general questions HPI Form  How many servings of fruits and vegetables do you eat daily?: 0-1  On average, how many sweetened beverages do " you drink each day (Examples: soda, juice, sweet tea, etc.  Do NOT count diet or artificially sweetened beverages)?: 1  How many minutes a day do you exercise enough to make your heart beat faster?: 30 to 60  How many days a week do you exercise enough to make your heart beat faster?: 5  How many days per week do you miss taking your medication?: 0

## 2024-07-05 ENCOUNTER — PATIENT OUTREACH (OUTPATIENT)
Dept: CARE COORDINATION | Facility: CLINIC | Age: 64
End: 2024-07-05

## 2024-07-05 NOTE — PROGRESS NOTES
Clinic Care Coordination Contact  Community Health Worker Initial Outreach    Patient states that he hasn't been working since 11/23/23. Patient went to see his work comp doctor 3 weeks ago. Patient states that he can hardly stand for more than an hour.   He is still employed at his workplace and receives weekly workers comp payments.     Patients wants to look into permanent disability and has gone to social security office to talk about this. Patient states that he was told to go to PCP to obtain a letter from PCP and then bring it to them.     Patient states that he was told that he could retire now by a social security . Patient stacy been working with an  for his disability.     At this time, Patient is aware of processes and is working with correct agencies/organizations to help with his application. Patient states that he has stable food, housing, and transportation. CHW will send information about CC and Patient will reach out if other needs arise.     Patient accepts CC: No, Patient has no outstanding needs with CC at this time. Patient will be sent Care Coordination introduction letter for future reference.     SIGIFREDO Regan, B.S. [facility] Care Coordination  [facility]:  [place], [place] and [place]  [phone]  [email or web address]

## 2024-07-05 NOTE — RESULT ENCOUNTER NOTE
Brian Orellana,     Femur and Knee Xrays both look good. No issues there. Reassuring.     Let me know if you have any questions or concerns,     Jez Everett PA-C  [facility]

## 2024-07-05 NOTE — LETTER
[facility] CARE COORDINATION  [address]  [facility] [address]    July 5, 2024    Esteban Nicholas  [address] [facility]   [facility] [place] [facility] [address]      Dear Esteban,    I am a clinic community health worker who works with Jez Everett PA-C with the [facility]. I wanted to thank you for spending the time to talk with me.  Below is a description of clinic care coordination and how I can further assist you.       The clinic care coordination team is made up of a registered nurse, , financial resource worker and community health worker who understand the health care system. The goal of clinic care coordination is to help you manage your health and improve access to the health care system. Our team works alongside your provider to assist you in determining your health and social needs. We can help you obtain health care and community resources, providing you with necessary information and education. We can work with you through any barriers and develop a care plan that helps coordinate and strengthen the communication between you and your care team.  Our services are voluntary and are offered without charge to you personally.    Please feel free to contact me with any questions or concerns regarding care coordination and what we can offer.      We are focused on providing you with the highest-quality healthcare experience possible.    Sincerely,     Fior Pedraza, HARRISON  Clinic Care Coordination  [phone]

## 2024-07-08 ENCOUNTER — TELEPHONE (OUTPATIENT)
Dept: FAMILY MEDICINE | Facility: CLINIC | Age: 64
End: 2024-07-08

## 2024-07-08 NOTE — TELEPHONE ENCOUNTER
Patient is calling regarding an order for percocet. This is for his work injury and pain. He has not received an order for it.       DEE Wu  [facility]

## 2024-07-09 ENCOUNTER — THERAPY VISIT (OUTPATIENT)
Dept: PHYSICAL THERAPY | Facility: CLINIC | Age: 64
End: 2024-07-09
Payer: OTHER MISCELLANEOUS

## 2024-07-09 DIAGNOSIS — M54.41 ACUTE RIGHT-SIDED LOW BACK PAIN WITH RIGHT-SIDED SCIATICA: Primary | ICD-10-CM

## 2024-07-09 DIAGNOSIS — R26.9 ABNORMAL GAIT: ICD-10-CM

## 2024-07-09 PROCEDURE — 97110 THERAPEUTIC EXERCISES: CPT | Mod: GP | Performed by: PHYSICAL THERAPIST

## 2024-07-09 NOTE — PROGRESS NOTES
"   07/09/24 0500   Appointment Info   Signing clinician's name / credentials Celia Milner, PT, DPT, OCS   Total/Authorized Visits 18   Visits Used 16   Medical Diagnosis Acute right-sided low back pain with right-sided sciatica (M54.41)   PT Tx Diagnosis acute low back pain with radicular pain - lateral shift L   Other pertinent information workers comp   Progress Note/Certification   Onset of illness/injury or Date of Surgery 11/22/23  (initial DOI)   Therapy Frequency 1x/weekly, tapering to every other week   Predicted Duration add 2.5 months for conditioning   Progress Note Due Date 08/09/24   Progress Note Completed Date 06/05/24   GOALS   PT Goals 2   PT Goal 1   Goal Identifier Ambulation   Goal Description Patient will report ability to ambulate half the day at work with normalized gait, no AD, and no increase in pain to promote a healthy and active lifestyle as well as return to work fully.   Goal Progress Not yet back to work; tolerates 45 min of ambulation 2x/day, continuing to progress his tolerance each week - unable to progress due to thigh spams   Target Date 07/01/24   PT Goal 2   Goal Identifier Lifting/bending   Goal Description Patient will demonstrate lifting 25# item from floor to waist height with hip hinge strategy and no increase in pain x 5 reps to indicate ability to complete household tasks such as dressing, groceries, laundry, and yardwork.   Goal Progress 70# no pain x 15 reps   Target Date 04/21/24   Date Met 06/05/24   Subjective Report   Subjective Report Reports thigh pain is the same. Did new HEP; no significant increase in pain but continues to be most limited by \"cramping that lasts hours\" if he walks 45 min or longer. Working with worker's comp, his PCP, additional  to manage his case and make return to work decisions.   Objective Measures   Objective Measures Objective Measure 1;Objective Measure 2;Objective Measure 3;Objective Measure 4;Objective Measure 5   Objective " "Measure 1   Objective Measure Lumbar AROM   Objective Measure 2   Objective Measure Radicular sx   Details No change to anterior thigh pain or knee pain with Praful's, femoral nerve tensioning, UPA/CPA mobs to entire lumbar spine.   Objective Measure 3   Objective Measure Hip   Details negative log roll, continued tightness in PROM ER/IR at 90 deg flexion   Objective Measure 4   Objective Measure Knee PROM   Details 15-0-128 bilateral, no pain. No lag with short arc quad   Objective Measure 5   Objective Measure Gait   Details less antalgia, good step length and stance time.   PT Modalities   PT Modalities Electrical Stimulation   Electrical Stimulation   Electrical Stim -Type TENS   Intensity 15   Duration 10 min   Frequency/Pattern Premod   Location L3-5 midline   Patient Response/Progress trialed but electrodes wouldn't stick so held for today   Treatment Interventions (PT)   Interventions Therapeutic Procedure/Exercise;Gait Training;Manual Therapy;Therapeutic Activity   Therapeutic Procedure/Exercise   Therapeutic Procedures: strength, endurance, ROM, flexibility minutes (71324) 40   Therapeutic Procedures Ther Proc 2;Ther Proc 3;Ther Proc 4   Ther Proc 1 prone pressup with hips shifted to the left   Ther Proc 2 HEP/POC   Ther Proc 3 Nustep   Ther Proc 3 - Details x 10 min lvl 4-7 intervals for endurance   PTRx Ther Proc 1 Standing Quadricep Stretch   PTRx Ther Proc 1 - Details HEP - ed to do this when he cramps up on his walk   PTRx Ther Proc 2  Hip Flexor Stretch Gui Test Position   PTRx Ther Proc 2 - Details HEP   PTRx Ther Proc 3 Marching Bridge   PTRx Ther Proc 3 - Details HEP   PTRx Ther Proc 4 Stepdown Forward   PTRx Ther Proc 4 - Details 6\" step, 2 x 10 bilateral (1 set forward, 1 set backward)   PTRx Ther Proc 5 Shoulder Flexion   PTRx Ther Proc 5 - Details HEP   PTRx Ther Proc 6 knee extension   PTRx Ther Proc 6 - Details AG in hooklying position, x 10, 2 x 10 x 3#   PTRx Ther Proc 7 Short arc quad "   PTRx Ther Proc 7 - Details into full TKE (hyperextend 15 deg) x 10 bilateral   Skilled Intervention LE strength focused around musculature of hip and knee, biceps   Patient Response/Progress moderate to hard RPE   Therapeutic Activity   Therapeutic Activities Ther Act 3   Ther Act 1 Transfers   Ther Act 2 Ambulation ed   Ther Act 3 work   Manual Therapy   Manual Therapy Manual Therapy 2;Manual Therapy 3;Manual Therapy 4;Manual Therapy 5;Manual Therapy 6;Manual Therapy 7;Manual Therapy 8   Manual Therapy 2 MET R anterior innom   Manual Therapy 3 MET pubic symph   Manual Therapy 4 Hip mobs   Manual Therapy 5 Lumbar mobs   Skilled Intervention targeting sx provocation and relief   Patient Response/Progress no provocation into anterior thigh   Education   Education Comments PTRx handouts   Plan   Home program initiated, daily   Updates to plan of care Recommend adjusting focus to R knee and LUE. Contacted provider for new referral.   Plan for next session asked pt to ambulate 45 min just prior to next appointment so we can assess his sx following a walk   Comments   Comments Recommend continued skilled PT in [place] rather than delay to get work conditioning referral. Pt has made progress with ambulation endurance and function, but still scores in a moderate disability range.   Total Session Time   Timed Code Treatment Minutes 40   Total Treatment Time (sum of timed and untimed services) 40       PLAN  Little progress, but only 2 weeks of LE strength/endurance focused ex. Need about 6 weeks for physiological adaption. Continue to monitor.    Beginning/End Dates of Progress Note Reporting Period:  06/05/24 to 07/09/2024    Referring Provider:  Jez Everett

## 2024-07-26 ENCOUNTER — THERAPY VISIT (OUTPATIENT)
Dept: PHYSICAL THERAPY | Facility: CLINIC | Age: 64
End: 2024-07-26
Payer: OTHER MISCELLANEOUS

## 2024-07-26 DIAGNOSIS — M54.41 ACUTE RIGHT-SIDED LOW BACK PAIN WITH RIGHT-SIDED SCIATICA: Primary | ICD-10-CM

## 2024-07-26 DIAGNOSIS — R26.9 ABNORMAL GAIT: ICD-10-CM

## 2024-07-26 PROCEDURE — 97530 THERAPEUTIC ACTIVITIES: CPT | Mod: GP | Performed by: PHYSICAL THERAPIST

## 2024-07-26 PROCEDURE — 97110 THERAPEUTIC EXERCISES: CPT | Mod: GP | Performed by: PHYSICAL THERAPIST

## 2024-08-02 ENCOUNTER — VIRTUAL VISIT (OUTPATIENT)
Dept: FAMILY MEDICINE | Facility: CLINIC | Age: 64
End: 2024-08-02
Payer: OTHER MISCELLANEOUS

## 2024-08-02 DIAGNOSIS — M54.41 ACUTE RIGHT-SIDED LOW BACK PAIN WITH RIGHT-SIDED SCIATICA: Primary | ICD-10-CM

## 2024-08-02 DIAGNOSIS — M79.661 PAIN OF RIGHT LOWER LEG: ICD-10-CM

## 2024-08-02 PROCEDURE — 99442 PR PHYSICIAN TELEPHONE EVALUATION 11-20 MIN: CPT | Mod: 93 | Performed by: PHYSICIAN ASSISTANT

## 2024-08-02 NOTE — LETTER
August 2, 2024      Esteban Nicholas  [address] [facility]   [place] [address]        To Whom It May Concern:    Esteban Nicholas  was seen on 8/2/24.  Please excuse him until 9/2/24 due to injury.      Sincerely,      Jez Everett PA-C     4 (moderate pain)

## 2024-08-02 NOTE — NURSING NOTE
Signed letter faxed to [phone] Attn Kush.   Placed in accordion folder pink pod.  Licha Jacobson CMA

## 2024-08-02 NOTE — PROGRESS NOTES
Esteban is a 64 year old who is being evaluated via a billable telephone visit.    What phone number would you like to be contacted at? [phone]  How would you like to obtain your AVS? Mail a copy  Originating Location (pt. Location): Home    Distant Location (provider location):  On-site    Assessment & Plan     Acute right-sided low back pain with right-sided sciatica  Pain of right lower leg  Continue to work closely with physical therapy.  Will send an additional 1 month excuse for work.  Is working with medical records to get approval to send his recent medical records for review.  Continue Tylenol as needed and gabapentin scheduled.  Close follow-up with new or worsening symptoms.    Subjective   Esteban is a 64 year old, presenting for the following health issues:  Follow Up and Back Pain    Here today for follow-up in regards to ongoing right-sided lower back pain with right-sided sciatica.  Continues to work with physical therapy diligently.  Up to walking 45 minutes every other day.  Still it is causing him issue is the upper right leg muscle spasms which causes him significant pain.  Unable to work.  Working with a  to decide disability etc.     Attn: Kush @ [phone]     Review of Systems  Constitutional, neuro, ENT, endocrine, pulmonary, cardiac, gastrointestinal, genitourinary, musculoskeletal, integument and psychiatric systems are negative, except as otherwise noted.      Objective           Vitals:  No vitals were obtained today due to virtual visit.    Physical Exam   General: Alert and no distress //Respiratory: No audible wheeze, cough, or shortness of breath // Psychiatric:  Appropriate affect, tone, and pace of words        Phone call duration: 10 minutes    The likelihood of other entities in the differential is insufficient to justify any further testing for them at this time. This was explained to the patient. The patient was advised that persistent or worsening symptoms  would require further evaluation. Patient advised to call the office and if unable to reach to go to the emergency room if they develop any new or worsening symptoms. Expressed understanding and agreement with above stated plan.     Signed Electronically by: Jez Everett PA-C

## 2024-10-04 ENCOUNTER — TELEPHONE (OUTPATIENT)
Dept: FAMILY MEDICINE | Facility: CLINIC | Age: 64
End: 2024-10-04

## 2024-10-04 DIAGNOSIS — S46.211D STRAIN OF RIGHT BICEPS MUSCLE, SUBSEQUENT ENCOUNTER: ICD-10-CM

## 2024-10-04 DIAGNOSIS — M54.41 ACUTE RIGHT-SIDED LOW BACK PAIN WITH RIGHT-SIDED SCIATICA: Primary | ICD-10-CM

## 2024-10-04 DIAGNOSIS — M79.661 PAIN OF RIGHT LOWER LEG: ICD-10-CM

## 2024-10-04 NOTE — TELEPHONE ENCOUNTER
Reason for Call:  Other Discuss issues    Detailed comments: Patient would like Jez Karlos to give him a call. He wants to disuss some things with him but doesn't have ins anymore and didn't want to schedule a phone visit.     Phone Number Patient can be reached at: Cell number on file:    Telephone Information:   Mobile [phone]       Best Time: any    Can we leave a detailed message on this number? YES    Call taken on 10/4/2024 at 11:17 AM by Tona Son

## 2024-10-09 NOTE — TELEPHONE ENCOUNTER
Feeling improved. Able to walk/stand for more extended periods of time which is reassuring.  Previously had spoken with our care coordination team to consider disability.  Stated that time he was not ready.  He is now ready. Signed care coordination referral.

## 2024-10-10 ENCOUNTER — PATIENT OUTREACH (OUTPATIENT)
Dept: CARE COORDINATION | Facility: CLINIC | Age: 64
End: 2024-10-10

## 2024-10-10 DIAGNOSIS — Z71.89 OTHER SPECIFIED COUNSELING: Primary | Chronic | ICD-10-CM

## 2024-10-10 NOTE — Clinical Note
Brian Gamboa, Phone Assessment scheduled on 10/15/24 at 2:00pm. Please connect before you call. Thank you Fior

## 2024-10-10 NOTE — PROGRESS NOTES
Clinic Care Coordination Contact  Community Health Worker Initial Outreach    CHW introduced self, intent of call, and offered the CC program.    Patient stated he applied for early shelter.  Patient stated he receives a monthly check from Social Security.  Patient stated he would like to apply for SSDI.  Patient stated he does not have any insurance as of March 2024.    CHW placed a FRW Referral for insurance    Reason for Referral:  Advance Care Planning  Financial Support  Insurance  Applying for Disability        CHW Initial Information Gathering:  Referral Source: PCP  Preferred Hospital: Other (No Preference)  Current living arrangement:: I live in a private home with spouse  Type of residence:: Apartment  Community Resources: None  Supplies Currently Used at Home: None  Equipment Currently Used at Home: none  Informal Support system:: Spouse  No PCP office visit in Past Year: No  Transportation means:: Regular car  CHW Additional Questions  If ED/Hospital discharge, follow-up appointment scheduled as recommended?: N/A  Medication changes made following ED/Hospital discharge?: N/A  MyChart active?: No  Patient agreeable to assistance with activating MyChart?: No    Patient accepts CC: Yes. Patient scheduled for assessment with BENNY Gamboa on 10/15/24 at 2:00pm. Patient noted desire to discuss disability and CC support.     SIGIFREDO Navas  Clinic Care Coordination  [facility] Clinics: [facility] [place]  Phone: [phone]

## 2024-10-11 ENCOUNTER — PATIENT OUTREACH (OUTPATIENT)
Dept: CARE COORDINATION | Facility: CLINIC | Age: 64
End: 2024-10-11

## 2024-10-15 ENCOUNTER — PATIENT OUTREACH (OUTPATIENT)
Dept: CARE COORDINATION | Facility: CLINIC | Age: 64
End: 2024-10-15

## 2024-10-15 ENCOUNTER — PATIENT OUTREACH (OUTPATIENT)
Dept: NURSING | Facility: CLINIC | Age: 64
End: 2024-10-15

## 2024-10-15 ASSESSMENT — ACTIVITIES OF DAILY LIVING (ADL): DEPENDENT_IADLS:: INDEPENDENT

## 2024-10-15 NOTE — LETTER
[facility] CARE COORDINATION  [address]  [place] [address]    October 15, 2024    Esteban Nicholas  [address]   [place] [address]      Dear Esteban,    I am a clinic care coordinator who works with Jez Everett PA-C with the [facility]. I wanted to thank you for spending the time to talk with me.  Below is a description of clinic care coordination and how I can further assist you.       The clinic care coordination team is made up of a registered nurse, , financial resource worker and community health worker who understand the health care system. The goal of clinic care coordination is to help you manage your health and improve access to the health care system. Our team works alongside your provider to assist you in determining your health and social needs. We can help you obtain health care and community resources, providing you with necessary information and education. We can work with you through any barriers and develop a care plan that helps coordinate and strengthen the communication between you and your care team.  Our services are voluntary and are offered without charge to you personally.    Please feel free to contact me with any questions or concerns regarding care coordination and what we can offer.      We are focused on providing you with the highest-quality healthcare experience possible.    Sincerely,     Bee Naik,  [facility]  Clinic Care Coordinator  [facility]  [phone]  [email or web address]      Enclosed: Resources for applying for disability, [employer]        Disability Specialists (We often recommend this service)  [phone]  [email or web address]/  We know you d rather be healthy and working but understand that may no longer be possible for you. That s why we will:  Discuss your situation with  you at no charge.  Determine if you should pursue a disability claim.  Answer technical and procedural questions about Social Security programs.     [place]  Disability  [phone]  [email or web address]  From our main offices in [place], we handle Social Security Disability Insurance (SSDI) and Supplemental Security Income (SSI) claims, applications and appeals for people throughout the nation.

## 2024-10-15 NOTE — PROGRESS NOTES
Clinic Care Coordination Contact  Clinic Care Coordination Contact  OUTREACH    Referral Information:  Referral Source: PCP    SW spoke to pt about his current needs and goals.  Pt shares that he did not want to accept that he could no longer work but he has finally accepted it.  He shares that he is concerned about his utilities being shut off, as they cannot pay.  Pt shares that food costs are an issue, and he would like SW assistance to sign up for this, as he struggles a bit with technology.   SW will enroll pt in [employer].  Pt shares that he does not know how to sign up for SSDI, and SW recommends he use a , as it is a very complex process.  Pt had many questions about this, and SW answered them.  Pt shares that he also pays child support which leaves him with no money leftover.  Pt was very polite, and appreciative of the assistance.    SHEMAR will enroll pt in CCC, and request that FRW assist pt in applying for Emergency Assistance, as he cannot pay rent in full.  SHEMAR will also ask FRW to see if pt is eligible  for SNAP.  SHEMAR enrolled pt in [employer], and will mail pt the information he needs in order to use [employer].   Pt would like to enroll in CCC so he can have extra support.      Chief Complaint   Patient presents with    Clinic Care Coordination - Initial        Universal Utilization:      Utilization      No Show Count (past year)  1             ED Visits  1             Hospital Admissions  0                    Current as of: 10/15/2024  1:20 PM                Clinical Concerns:  Current Medical Concerns:  Psychosocial     Current Behavioral Concerns: N/A    Education Provided to patient: FRW, CCC, [employer],  Disability Specialists, SNAP, EA      Health Maintenance Reviewed: Due/Overdue   Health Maintenance Due   Topic Date Due    YEARLY PREVENTIVE VISIT  Never done    ADVANCE CARE PLANNING  Never done    COLORECTAL CANCER SCREENING  Never done    HIV SCREENING  Never done    HEPATITIS C SCREENING   Tolerated infusion well. Reviewed therapy plan, offered education material and/or discharge material, reviewed medication information and signs and symptoms  and educated on possible side effects, verbalizes good knowledge of current plan patient verbalizes understanding, and has no signs or symptoms to report at this time. Patient discharged. Patient alert and oriented x3. No distress noted. Vital signs stable. Patient denies any new or worsening pain. Patient denies any needs. All questions answered. Next appointment scheduled. declines copy of AVS. Patient stayed for 30 minute observation after completion of infusion. Never done    LIPID  Never done    ZOSTER IMMUNIZATION (1 of 2) Never done    INFLUENZA VACCINE (1) 09/01/2024    COVID-19 Vaccine (4 - 2024-25 season) 09/01/2024       Clinical Pathway: None    Medication Management:  Medication review status:        Functional Status:  Dependent ADLs:: Independent  Dependent IADLs:: Independent  Mobility Status: Independent    Living Situation:  Current living arrangement:: I live in a private home with spouse  Type of residence:: Apartment    Lifestyle & Psychosocial Needs:    Social Determinants of Health     Food Insecurity: Not on file   Depression: Not at risk (3/7/2024)    PHQ-2     PHQ-2 Score: 0   Housing Stability: Not on file   Tobacco Use: Low Risk  (8/2/2024)    Patient History     Smoking Tobacco Use: Never     Smokeless Tobacco Use: Never     Passive Exposure: Not on file   Financial Resource Strain: Low Risk  (10/15/2024)    Financial Resource Strain     Within the past 12 months, have you or your family members you live with been unable to get utilities (heat, electricity) when it was really needed?: No   Alcohol Use: Not on file   Transportation Needs: Not on file   Physical Activity: Not on file   Interpersonal Safety: Low Risk  (12/20/2023)    Interpersonal Safety     Do you feel physically and emotionally safe where you currently live?: Yes     Within the past 12 months, have you been hit, slapped, kicked or otherwise physically hurt by someone?: No     Within the past 12 months, have you been humiliated or emotionally abused in other ways by your partner or ex-partner?: No   Stress: Not on file   Social Connections: Not on file   Health Literacy: Not on file              Chemical Dependency Status: No Current Concerns  Informal Support system:: Spouse        Resources and Interventions:  Current Resources:      Community Resources: None  Supplies Currently Used at Home: None  Equipment Currently Used at Home: none  Employment Status: disabled               Referrals Placed: Community Resources         Care Plan:  Care Plan: Community Resources       Problem: Unable to return to work       Note:     Goal Statement: Esteban will continue working with Care Coordination to ensure his needs are met for her overall health and wellbeing.  Date Goal Set: 10/15/24  Barriers: Financial    Strengths: Strong support system  Date to Achieve By: 10/15/25  Patient expressed understanding of goal: yes  Action steps to achieve this goal:  1. I will continue to follow up with medical team as needed  2. I will sign up for [employer]  3. I will review resources for applying for SSDI  4. I will work with FRW to discuss insurance, emergency assistance, SNAP  5. I will outreach to Care Coordination  for further questions or concerns          Goal: Establish adequate home support                             Patient/Caregiver understanding: yes    Outreach Frequency: monthly, more frequently as needed      Plan: SW/CHW to follow up in one month.     Bee Naik,  [facility]  Clinic Care Coordinator  [facility]  [phone]  [email or web address]

## 2024-10-15 NOTE — LETTER
[facility]  Patient Centered Plan of Care  About Me:        Patient Name:  Esteban Nicholas    YOB: 1960  Age:         64 year old   Jessica MRN:    [id number] Telephone Information:  Home Phone [phone]   Mobile [phone]       Address:  [address] [place] [address]  [place] [address] Email address:  [email or web address]      Emergency Contact(s)    Name Relationship Lgl Grd Work Phone Home Phone Mobile Phone   1. ALLY LOPES Spouse   [phone]            Primary language:  English     needed? No   [place] [facility]:  [phone] op. 1  Other communication barriers:English as a second language    Preferred Method of Communication:     Current living arrangement: I live in a private home with spouse    Mobility Status/ Medical Equipment: Independent        Health Maintenance  Health Maintenance Reviewed: Due/Overdue   Health Maintenance Due   Topic Date Due    YEARLY PREVENTIVE VISIT  Never done    ADVANCE CARE PLANNING  Never done    COLORECTAL CANCER SCREENING  Never done    HIV SCREENING  Never done    HEPATITIS C SCREENING  Never done    LIPID  Never done    ZOSTER IMMUNIZATION (1 of 2) Never done    INFLUENZA VACCINE (1) 09/01/2024    COVID-19 Vaccine (4 - 2024-25 season) 09/01/2024           My Access Plan  Medical Emergency 911   Primary Clinic Line [facility] [phone]   24 Hour Appointment Line [phone] or  [phone] ([phone]) (toll-free)   24 Hour Nurse Line [phone] (toll-free)   Preferred Urgent Care [facility], [phone]     Preferred Hospital Other (No Preference)     [id number] [address] AT On license of [facility] [place]     Behavioral Health Crisis Line The National Suicide Prevention Lifeline at 1-549.923.8486 or Text/Call 548           My Care Team Members  Patient Care Team         Relationship Specialty  Notifications Start End    Jez Everett PA-C PCP - General Physician Assistant - Medical  2/14/24     Phone: [phone] Fax: [phone]         [address] [place] [address]    Jez Everett PA-C Assigned PCP   11/23/23     Phone: [phone] Fax: [phone]         [address] [place] [address]    Jez Everett PA-C Assigned Pain Medication Provider   1/6/24     Phone: [phone] Fax: [phone]         [address] [place] [address]    Bee Naik LICSW Lead Care Coordinator  Admissions 10/10/24     Phone: [phone]         Estefanía Russo MA Financial Resource Worker   10/11/24     Phone: [phone]                     My Care Plans  Self Management and Treatment Plan    Care Plan  Care Plan: Community Resources       Problem: Unable to return to work       Note:     Goal Statement: Esteban will continue working with Care Coordination to ensure his needs are met for her overall health and wellbeing.  Date Goal Set: 10/15/24  Barriers: Financial    Strengths: Strong support system  Date to Achieve By: 10/15/25  Patient expressed understanding of goal: yes  Action steps to achieve this goal:  1. I will continue to follow up with medical team as needed  2. I will sign up for [employer]  3. I will review resources for applying for SSDI  4. I will work with FRW to discuss insurance, emergency assistance, SNAP  5. I will outreach to Care Coordination  for further questions or concerns          Goal: Establish adequate home support                             Action Plans on File:                       Advance Care Plans/Directives:             My Medical and Care Information  Problem List   Patient Active Problem List   Diagnosis    Acute right-sided low back pain with right-sided sciatica    Abnormal gait      Current Medications and Allergies:  See printed Medication Report.    Care Coordination Start Date: 10/9/2024    Frequency of Care Coordination: monthly, more frequently as needed     Form Last Updated: 10/15/2024

## 2024-11-14 ENCOUNTER — PATIENT OUTREACH (OUTPATIENT)
Dept: CARE COORDINATION | Facility: CLINIC | Age: 64
End: 2024-11-14

## 2024-11-14 NOTE — PROGRESS NOTES
Clinic Care Coordination Contact  Community Health Worker Follow Up    Care Gaps:     Health Maintenance Due   Topic Date Due    YEARLY PREVENTIVE VISIT  Never done    ADVANCE CARE PLANNING  Never done    COLORECTAL CANCER SCREENING  Never done    HIV SCREENING  Never done    HEPATITIS C SCREENING  Never done    LIPID  Never done    ZOSTER IMMUNIZATION (1 of 2) Never done    INFLUENZA VACCINE (1) 09/01/2024    COVID-19 Vaccine (4 - 2024-25 season) 09/01/2024       Did Not Address    Care Plan:   Care Plan: Community Resources       Problem: Unable to return to work       Note:     Goal Statement: Esteban will continue working with Care Coordination to ensure his needs are met for her overall health and wellbeing.  Date Goal Set: 10/15/24  Barriers: Financial    Strengths: Strong support system  Date to Achieve By: 10/15/25  Patient expressed understanding of goal: yes  Action steps to achieve this goal:  1. I will continue to follow up with medical team as needed  2. I will sign up for [employer]  3. I will review resources for applying for SSDI  4. I will work with FRW to discuss insurance, emergency assistance, SNAP  5. I will outreach to Care Coordination  for further questions or concerns          Goal: Establish adequate home support       This Visit's Progress: 20%                        Discussed:  Patient stated he plans to use the [employer] $80.00 on 11/22/24.  Patient stated he is very appreciative of the money for food.  Patient stated he would like to call the FRW but does not have the contact information.  Patient stated he applied for SSDI directly with Social Security.  Patient thanked CHW for calling.    Intervention and Education during outreach:     CHW provided the contact phone number for the FRW.    CHW encouraged patient to contact CC if there are any other changes or resources needed.  Patient acknowledged understanding.      CHW Plan: will outreach in one month    Fior Pedraza  CHW  Clinic Care Coordination  [facility] Clinics: [facility] [place], and [place] [facility]  Phone: [phone]

## 2024-11-20 ENCOUNTER — PATIENT OUTREACH (OUTPATIENT)
Dept: CARE COORDINATION | Facility: CLINIC | Age: 64
End: 2024-11-20

## 2024-11-20 NOTE — PROGRESS NOTES
FRW Update  11/20/24  FRW called and patient did get application and he is going to bring it to the [facility] this week

## 2024-11-25 ENCOUNTER — PATIENT OUTREACH (OUTPATIENT)
Dept: CARE COORDINATION | Facility: CLINIC | Age: 64
End: 2024-11-25

## 2024-11-25 NOTE — PROGRESS NOTES
Clinic Care Coordination Contact  Care Coordination Clinician Chart Review    Situation: Patient chart reviewed by Care Coordinator.       Background: Care Coordination Program started: 10/9/2024. Initial assessment completed and patient-centered care plan(s) were developed with participation from patient. Lead CC handed patient off to CHW for continued outreaches.       Assessment: Per chart review, patient outreach completed by CC CHW on 11/14/24.  Patient is actively working to accomplish goal(s). Patient's goal(s) appropriate and relevant at this time. Patient is not due for updated Plan of Care.  Assessments will be completed annually or as needed/with change of patient status.      Care Plan: Community Resources       Problem: Unable to return to work       Note:     Goal Statement: Esteban will continue working with Care Coordination to ensure his needs are met for her overall health and wellbeing.  Date Goal Set: 10/15/24  Barriers: Financial    Strengths: Strong support system  Date to Achieve By: 10/15/25  Patient expressed understanding of goal: yes  Action steps to achieve this goal:  1. I will continue to follow up with medical team as needed  2. I will sign up for [employer]  3. I will review resources for applying for SSDI  4. I will work with FRW to discuss insurance, emergency assistance, SNAP  5. I will outreach to Care Coordination  for further questions or concerns          Goal: Establish adequate home support       This Visit's Progress: 20%                               Plan/Recommendations: The patient will continue working with Care Coordination to achieve goal(s) as above. CHW will continue outreaches at minimum every 30 days and will involve Lead CC as needed or if patient is ready to move to Maintenance. Lead CC will continue to monitor CHW outreaches and patient's progress to goal(s) every 6 weeks.     Plan of Care updated and sent to patient: Humaira Naik   [facility]  Clinic Care Coordinator  [facility]  [phone]  [email or web address]         IV discontinued, cath removed intact

## 2024-12-18 ENCOUNTER — PATIENT OUTREACH (OUTPATIENT)
Dept: CARE COORDINATION | Facility: CLINIC | Age: 64
End: 2024-12-18

## 2024-12-18 NOTE — PROGRESS NOTES
FRW Update   12/18/24 FRW received message that he did not get the email or the application and FRW emailed and mailed out a new copy for the application

## 2025-01-06 ENCOUNTER — PATIENT OUTREACH (OUTPATIENT)
Dept: CARE COORDINATION | Facility: CLINIC | Age: 65
End: 2025-01-06

## 2025-01-06 NOTE — LETTER
[facility]  Patient Centered Plan of Care  About Me:        Patient Name:  Esteban Nicholas    YOB: 1960  Age:         64 year old   Jessica MRN:    [id number] Telephone Information:  Home Phone [phone]   Mobile [phone]       Address:  [address] [place] [address] [address] [facility]  [place] [address] Email address:  [email or web address]      Emergency Contact(s)    Name Relationship Lgl Grd Work Phone Home Phone Mobile Phone   1. ALLY LOPES Spouse   [phone]            Primary language:  English     needed? No   [employer]:  [phone] op. 1  Other communication barriers:English as a second language    Preferred Method of Communication:     Current living arrangement: I live in a private home with spouse    Mobility Status/ Medical Equipment: Independent        Health Maintenance  Health Maintenance Reviewed: Due/Overdue   Health Maintenance Due   Topic Date Due    ADVANCE CARE PLANNING  Never done    YEARLY PREVENTIVE VISIT  Never done    COLORECTAL CANCER SCREENING  Never done    HIV SCREENING  Never done    HEPATITIS C SCREENING  Never done    LIPID  Never done    Pneumococcal Vaccine: 50+ Years (1 of 1 - PCV) Never done    ZOSTER IMMUNIZATION (1 of 2) Never done    INFLUENZA VACCINE (1) 09/01/2024    COVID-19 Vaccine (4 - 2024-25 season) 09/01/2024    PHQ-2 (once per calendar year)  01/01/2025           My Access Plan  Medical Emergency 911   Primary Clinic Line [facility] - [phone]   24 Hour Appointment Line [phone] or  [phone]) (toll-free)   24 Hour Nurse Line [phone] (toll-free)   Preferred Urgent Care [facility], [phone]     Preferred Hospital Other (No Preference)     Preferred Pharmacy [id number] - [place], [place] - [address] AT [facility] [address]     Behavioral Health Crisis Line The National Suicide Prevention Lifeline at 1-196.403.4947 or  Text/Call 988           My Care Team Members  Patient Care Team         Relationship Specialty Notifications Start End    Jez Everett PA-C PCP - General Physician Assistant - Medical  2/14/24     Phone: [phone] Fax: [phone] [address] [place] [address]    Jez Everett PA-C Assigned PCP   11/23/23     Phone: [phone] Fax: [phone] [address] [place] [address]    Bee Naik LIC Lead Care Coordinator  Admissions 10/10/24     Phone: [phone]         Estefanía Russo MA Financial Resource Worker   10/11/24     Phone: [phone]                     My Care Plans  Self Management and Treatment Plan    Care Plan  Care Plan: Community Resources       Problem: Unable to return to work       Note:     Goal Statement: Esteban will continue working with Care Coordination to ensure his needs are met for her overall health and wellbeing.  Date Goal Set: 10/15/24  Barriers: Financial    Strengths: Strong support system  Date to Achieve By: 10/15/25  Patient expressed understanding of goal: yes  Action steps to achieve this goal:  1. I will continue to follow up with medical team as needed  2. I will sign up for [employer]  3. I will review resources for applying for SSDI  4. I will work with FRW to discuss insurance, emergency assistance, SNAP  5. I will outreach to Care Coordination  for further questions or concerns          Goal: Establish adequate home support       This Visit's Progress: 30% Recent Progress: 20%                            Action Plans on File:                       Advance Care Plans/Directives:             My Medical and Care Information  Problem List   Patient Active Problem List   Diagnosis    Acute right-sided low back pain with right-sided sciatica    Abnormal gait      Current Medications:  Please refer to the most recent medication list provided to you by your medical team and reach out to your  provider with any questions or to make any corrections.    Care Coordination Start Date: 10/9/2024   Frequency of Care Coordination: monthly, more frequently as needed     Form Last Updated: 01/06/2025

## 2025-01-06 NOTE — PROGRESS NOTES
Clinic Care Coordination Contact  Care Coordination Clinician Chart Review    Situation: Patient chart reviewed by Care Coordinator.       Background: Care Coordination Program started: 10/9/2024. Initial assessment completed and patient-centered care plan(s) were developed with participation from patient. Lead CC handed patient off to CHW for continued outreaches.       Assessment: Per chart review, patient outreach completed by CC CHW on 12/13/24.  Patient is actively working to accomplish goal(s). Patient's goal(s) appropriate and relevant at this time. Patient is not due for updated Plan of Care.  Assessments will be completed annually or as needed/with change of patient status.      Care Plan: Community Resources       Problem: Unable to return to work       Note:     Goal Statement: Esteban will continue working with Care Coordination to ensure his needs are met for her overall health and wellbeing.  Date Goal Set: 10/15/24  Barriers: Financial    Strengths: Strong support system  Date to Achieve By: 10/15/25  Patient expressed understanding of goal: yes  Action steps to achieve this goal:  1. I will continue to follow up with medical team as needed  2. I will sign up for [employer]  3. I will review resources for applying for SSDI  4. I will work with FRW to discuss insurance, emergency assistance, SNAP  5. I will outreach to Care Coordination  for further questions or concerns          Goal: Establish adequate home support       This Visit's Progress: 30% Recent Progress: 20%                               Plan/Recommendations: The patient will continue working with Care Coordination to achieve goal(s) as above. CHW will continue outreaches at minimum every 30 days and will involve Lead CC as needed or if patient is ready to move to Maintenance. Lead CC will continue to monitor CHW outreaches and patient's progress to goal(s) every 6 weeks.     Plan of Care updated and sent to patient: Yes, via  mail    Bee Naik,  [facility]  Clinic Care Coordinator  [facility]  [phone]  [email or web address]

## 2025-01-15 ENCOUNTER — PATIENT OUTREACH (OUTPATIENT)
Dept: CARE COORDINATION | Facility: CLINIC | Age: 65
End: 2025-01-15

## 2025-01-15 NOTE — PROGRESS NOTES
04/25/22    Zach Martinez  1954    UROLOGICAL EVALUATION:  Chief Complaint   Patient presents with   â¢ Follow-up   â¢ Office Visit       HISTORY OF PRESENT ILLNESS:  This 74year old male complains of occasional penile pain, nocturia x2, occasional urinary frequency, hesitancy and urgency on Elmiron. AUA score 7. He had prostate UroLift, 5 implants 10/19/18. Alpha blockers caused unconsciousness. He denies flank pain, suprapubic pain, scrotal pain, decreased stream and urinary incontinence. ALLERGIES:  No Known Allergies    MEDICATIONS:  Outpatient Medications Marked as Taking for the 4/25/22 encounter (Office Visit) with Alma Ellis MD   Medication Sig Dispense Refill   â¢ metoPROLOL succinate (TOPROL-XL) 50 MG 24 hr tablet Take 1 tablet by mouth daily. 90 tablet 3   â¢ lisinopril (ZESTRIL) 10 MG tablet Take 1 tablet by mouth daily. 90 tablet 3   â¢ esomeprazole (NexIUM) 20 MG capsule Take 1 capsule by mouth daily (before breakfast). 90 capsule 3   â¢ dicyclomine (BENTYL) 10 MG capsule Take 1 capsule by mouth daily. 90 capsule 3   â¢ allopurinol (ZYLOPRIM) 300 MG tablet Take 1 tablet by mouth daily. 90 tablet 3   â¢ pentosan polysulfate (Elmiron) 100 MG capsule Take 2 capsules by mouth 2 times daily. 360 capsule 3   â¢ cholestyramine (QUESTRAN) 4 GM/DOSE powder Take 4 g by mouth daily (with breakfast). 378 g 11   â¢ Multiple Vitamins-Minerals (MENS 50+ MULTI VITAMIN/MIN PO) Take 1 tablet by mouth daily.          PAST MEDICAL HISTORY:    Past Medical History:   Diagnosis Date   â¢ Allergic rhinitis    â¢ BPH (benign prostatic hyperplasia)    â¢ GERD (gastroesophageal reflux disease)    â¢ HTN (hypertension)    â¢ Hyperlipidemia    â¢ IC (interstitial cystitis)    â¢ PONV (postoperative nausea and vomiting)        PAST SURGICAL HISTORY:    COLONOSCOPY W/ BIOPSIES AND POLYPECTOMY         02/19/2007    ESOPHAGOGASTRODUODENOSCOPY TRANSORAL FLEX W/BX* 02/19/2007      Comment: EGD with Bx    COLONOSCOPY FRW Update  1/15/25 Established patient outreach attempted x 1 was unable to reach. Unable to leave a message on voicemail with call back information and requested return call. Due to mailbox full  Plan: Current outreach date reflects FRW 's follow up within 30 days.     "                    11/17/2016      Comment: Per Dr. Rose Meadows repeat in 3 years. HERNIA REPAIR                                                   Comment: as child    ESOPHAGOGASTRODUODENOSCOPY                      04/06/2017      Comment: repeat 6 months in Oct 2017 per Dr. Natalia Carrera                      02/01/2018      Comment: Dr. Hernando Loyd endoscopy for surveillance in                1 year    CYSTOSTOMY W/ RETROGRADE URETEROSCOPY                         PROSTATE SURGERY                                10/19/2018      Comment: Urolift procedure    UROLIFT TRANSPROSTATIC IMPLANT PROCEDURE        10/19/2018      Comment: Dr. Bossman Charles                                      08/14/201*    SOCIAL HISTORY:  Social History     Tobacco Use   â¢ Smoking status: Never Smoker   â¢ Smokeless tobacco: Never Used   Substance Use Topics   â¢ Alcohol use: No     Comment: Rarely       Sexually Active: Not Asked          FAMILY HISTORY:  Family History   Problem Relation Age of Onset   â¢ Dementia/Alzheimers Mother    â¢ Stroke Father    â¢ High blood pressure Father    â¢ Hypertension Father    â¢ Coronary Artery Disease Father      REVIEW OF SYSTEMS:    A 13 point review of systems was performed with positive findings noted as above and all other systems being negative. PHYSICAL EXAMINATION:  Vital Signs: Blood pressure 134/78, height 6' 1.5"" (1.867 m), weight 115.7 kg (255 lb). General: male appearing of stated age in no distress. Neurological: Alert and oriented x 3. Psychiatric: Appropriate with normal mood and affect. Skin: Warm and dry. Respiratory:  effort normal without sonorous breathing. Abdomen: without tenderness, mass, palpable bladder. Stool not indicated. Back: without tenderness, mass, or asymmetry. Musculoskeletal: Extremities without weakness, erythema, edema, ulcer, or cyanosis.   Gait: Ambulates " without assistance, shuffling or limp. DATA REVIEWED:  No visits with results within 1 Day(s) from this visit. Latest known visit with results is:   Office Visit on 12/28/2021   Component Date Value   â¢ Rapid SARS-COV-2 by PCR 12/28/2021 Not Detected    â¢ Influenza A by PCR 12/28/2021 Not Detected    â¢ Influenza B by PCR 12/28/2021 Not Detected    â¢ Isolation Guidelines 12/28/2021     â¢ Procedural Comment 12/28/2021       Prostate Specific Antigen (ng/mL)   Date Value   11/01/2021 2.81     Creatinine (mg/dL)   Date Value   11/01/2021 1.21 (H)     GFR Estimate, Non  (no units)   Date Value   12/16/2019 71     GFR Estimate,  (no units)   Date Value   12/16/2019 82     IMPRESSION:    Interstitial cystitis  Chronic prostatitis  Benign prostate hyperplasia  Penile pain  Irritable bowel syndrome  Coronary artery disease native heart native artery without angina  BMI greater than 30    RECOMMENDATIONS:Â    The patient elected to begin finasteride 5 mg daily, continue Elmiron 200 mg b.i.d and return 1 year for the above. Included in the discussion were risks of malignancy, urinary calculi, obstruction, pain, disability, bleeding, blood clot, infection, scarring, nerve or bowel problems, benign prostate hypertrophy, medications, side effects, precautions, other complications, options, and risks. MITCH Kelly Ra, M.D.

## 2025-01-16 ENCOUNTER — PATIENT OUTREACH (OUTPATIENT)
Dept: CARE COORDINATION | Facility: CLINIC | Age: 65
End: 2025-01-16

## 2025-01-16 NOTE — PROGRESS NOTES
Clinic Care Coordination Contact  Community Health Worker Follow Up    Care Gaps:     Health Maintenance Due   Topic Date Due    ADVANCE CARE PLANNING  Never done    YEARLY PREVENTIVE VISIT  Never done    COLORECTAL CANCER SCREENING  Never done    HIV SCREENING  Never done    HEPATITIS C SCREENING  Never done    LIPID  Never done    Pneumococcal Vaccine: 50+ Years (1 of 1 - PCV) Never done    ZOSTER IMMUNIZATION (1 of 2) Never done    INFLUENZA VACCINE (1) 09/01/2024    COVID-19 Vaccine (4 - 2024-25 season) 09/01/2024    PHQ-2 (once per calendar year)  01/01/2025       Did Not Address    Care Plan:   Care Plan: Community Resources       Problem: Unable to return to work       Note:     Goal Statement: Esteban will continue working with Care Coordination to ensure his needs are met for her overall health and wellbeing.  Date Goal Set: 10/15/24  Barriers: Financial    Strengths: Strong support system  Date to Achieve By: 10/15/25  Patient expressed understanding of goal: yes  Action steps to achieve this goal:  1. I will continue to follow up with medical team as needed  2. I will sign up for [employer]  3. I will review resources for applying for SSDI  4. I will work with FRW to discuss insurance, emergency assistance, SNAP  5. I will outreach to Care Coordination  for further questions or concerns          Goal: Establish adequate home support       This Visit's Progress: 40% Recent Progress: 30%                        Discussed:  Patient stated he received the MA application.  Patient stated he is not going to move forward and apply.  Patient stated he is not comfortable providing bank statements or to include his wife's income.    Patient stated he does not have any outstanding [facility] bills.   Patient stated his SSDI is pending.  Patient stated he was told that it would take approximately six months.  Patient stated he applied directly over the phone.  Patient stated he went to the [facility] for food last month, everything went smoothly.  Patient thanked CHW for calling.  Patient stated he appreciates the calls.      Intervention and Education during outreach:     CHW encouraged patient to contact CC if there are any other changes or resources needed.  Patient acknowledged understanding.      CHW Plan: will outreach in one month.    SIGIFREDO Navas  Clinic Care Coordination  [facility] Clinics: [place], [place], [facility], and [place] [facility]  Phone: [phone]

## 2025-02-17 ENCOUNTER — PATIENT OUTREACH (OUTPATIENT)
Dept: CARE COORDINATION | Facility: CLINIC | Age: 65
End: 2025-02-17

## 2025-02-17 NOTE — PROGRESS NOTES
Clinic Care Coordination Contact  Care Coordination Clinician Chart Review    Situation: Patient chart reviewed by Care Coordinator.       Background: Care Coordination Program started: 10/9/2024. Initial assessment completed and patient-centered care plan(s) were developed with participation from patient. Lead CC handed patient off to CHW for continued outreaches.       Assessment: Per chart review, patient outreach completed by CC CHW on 1/16/25.  Patient is actively working to accomplish goal(s). Patient's goal(s) appropriate and relevant at this time. Patient is not due for updated Plan of Care.  Assessments will be completed annually or as needed/with change of patient status.      Care Plan: Community Resources       Problem: Unable to return to work       Note:     Goal Statement: Esteban will continue working with Care Coordination to ensure his needs are met for her overall health and wellbeing.  Date Goal Set: 10/15/24  Barriers: Financial    Strengths: Strong support system  Date to Achieve By: 10/15/25  Patient expressed understanding of goal: yes  Action steps to achieve this goal:  1. I will continue to follow up with medical team as needed  2. I will sign up for [employer] [facility]  3. I will review resources for applying for SSDI  4. I will work with FRW to discuss insurance, emergency assistance, SNAP  5. I will outreach to Care Coordination  for further questions or concerns          Goal: Establish adequate home support       This Visit's Progress: 40% Recent Progress: 30%                               Plan/Recommendations: The patient will continue working with Care Coordination to achieve goal(s) as above. CHW will continue outreaches at minimum every 30 days and will involve Lead CC as needed or if patient is ready to move to Maintenance. Lead CC will continue to monitor CHW outreaches and patient's progress to goal(s) every 6 weeks.     Plan of Care updated and sent to patient:  No    Bee Naik,  [facility]  Clinic Care Coordinator  [facility]  [phone]  [email or web address]

## 2025-02-18 ENCOUNTER — PATIENT OUTREACH (OUTPATIENT)
Dept: CARE COORDINATION | Facility: CLINIC | Age: 65
End: 2025-02-18

## 2025-02-18 NOTE — PROGRESS NOTES
Clinic Care Coordination Contact  Community Health Worker Follow Up    Care Gaps:     Health Maintenance Due   Topic Date Due    ADVANCE CARE PLANNING  Never done    YEARLY PREVENTIVE VISIT  Never done    COLORECTAL CANCER SCREENING  Never done    HIV SCREENING  Never done    HEPATITIS C SCREENING  Never done    LIPID  Never done    Pneumococcal Vaccine: 50+ Years (1 of 1 - PCV) Never done    ZOSTER IMMUNIZATION (1 of 2) Never done    INFLUENZA VACCINE (1) 09/01/2024    COVID-19 Vaccine (4 - 2024-25 season) 09/01/2024    PHQ-2 (once per calendar year)  01/01/2025       Care Gaps Last addressed on 2/18/25    Care Plan:   Care Plan: Community Resources       Problem: Unable to return to work       Note:     Goal Statement: Esteban will continue working with Care Coordination to ensure his needs are met for her overall health and wellbeing.  Date Goal Set: 10/15/24  Barriers: Financial    Strengths: Strong support system  Date to Achieve By: 10/15/25  Patient expressed understanding of goal: yes  Action steps to achieve this goal:  1. I will continue to follow up with medical team as needed  2. I will sign up for [employer]  3. I will review resources for applying for SSDI  4. I will work with FRW to discuss insurance, emergency assistance, SNAP  5. I will outreach to Care Coordination  for further questions or concerns          Goal: Establish adequate home support       This Visit's Progress: 50% Recent Progress: 40%                        Discussed:  Patient stated he goes to the [facility] for food every month.  Patient stated he was told that SSDI should have a decision soon.  Patient stated he is struggling financially.  Patient stated he is paying $555.00 per month for child support.  Patient stated in March 2025, he will start paying an extra $200.00 per month for a total of $755.00.    Intervention and Education during outreach:     CHW encouraged patient to contact CC if there are  any other changes or resources needed.  Patient acknowledged understanding.     CHW Plan: will outreach in one month.    Fior Pedraza, CHW  Clinic Care Coordination  [facility] Clinics: [place], and [place] [facility]  Phone: [phone]

## 2025-03-04 ENCOUNTER — PATIENT OUTREACH (OUTPATIENT)
Dept: CARE COORDINATION | Facility: CLINIC | Age: 65
End: 2025-03-04

## 2025-03-04 NOTE — PROGRESS NOTES
FRW Update  3/4/25FRW called and talked with patient over the phone he stated that he got a call from the [facility] and they needed income from wife and bank statements and he said it was to much and he no longer wanted to pursue getting insurance from the [facility]

## 2025-03-27 ENCOUNTER — PATIENT OUTREACH (OUTPATIENT)
Dept: CARE COORDINATION | Facility: CLINIC | Age: 65
End: 2025-03-27

## 2025-03-27 DIAGNOSIS — Z71.89 OTHER SPECIFIED COUNSELING: Primary | Chronic | ICD-10-CM

## 2025-03-27 NOTE — PROGRESS NOTES
Clinic Care Coordination Contact  Community Health Worker Follow Up    Care Gaps:     Health Maintenance Due   Topic Date Due    ADVANCE CARE PLANNING  Never done    YEARLY PREVENTIVE VISIT  Never done    COLORECTAL CANCER SCREENING  Never done    HIV SCREENING  Never done    HEPATITIS C SCREENING  Never done    LIPID  Never done    Pneumococcal Vaccine: 50+ Years (1 of 1 - PCV) Never done    ZOSTER IMMUNIZATION (1 of 2) Never done    INFLUENZA VACCINE (1) 09/01/2024    COVID-19 Vaccine (4 - 2024-25 season) 09/01/2024    PHQ-2 (once per calendar year)  01/01/2025       Care Gaps Last addressed on 3/27/25-AWV    Care Plan:   Care Plan: Community Resources       Problem: Unable to return to work       Note:     Goal Statement: Esteban will continue working with Care Coordination to ensure his needs are met for her overall health and wellbeing.  Date Goal Set: 10/15/24  Barriers: Financial    Strengths: Strong support system  Date to Achieve By: 10/15/25  Patient expressed understanding of goal: yes  Action steps to achieve this goal:  1. I will continue to follow up with medical team as needed  2. I will sign up for [employer] [facility]  3. I will review resources for applying for SSDI  4. I will work with FRW to discuss insurance, emergency assistance, SNAP  5. I will outreach to Care Coordination  for further questions or concerns          Goal: Establish adequate home support       This Visit's Progress: 60% Recent Progress: 50%                        Discussed:  Patient stated his SSDI funds have been garnished for Child Support payments.  Patient stated he should be finished paying by the end of this year.  Patient stated funds are tight.  Patient stated he does not have any outstanding [facility] bills.  Patient stated he is interested in applying for Energy Assistance to help pay his electric bill.  Patient stated he is current on rent.  Patient stated he goes to [facility] for food.    CHW placed a  FRW Referral for Energy Assistance.      Intervention and Education during outreach:     CHW encouraged patient to contact CC if there are any other changes or resources needed.  Patient acknowledged understanding.      CHW Plan: will outreach in one month    SIGIFREDO Navas  Clinic Care Coordination  [facility] Clinics: [place], [place], [place], and [place] [facility]  Phone: [phone]

## 2025-03-27 NOTE — PROGRESS NOTES
Clinic Care Coordination Contact      FRW Referral:    SIGIFREDO Gautam  Clinic Care Coordination  [facility] Clinics: [place], and [place] [facility]  Phone: [phone]

## 2025-03-31 ENCOUNTER — PATIENT OUTREACH (OUTPATIENT)
Dept: CARE COORDINATION | Facility: CLINIC | Age: 65
End: 2025-03-31

## 2025-03-31 NOTE — PROGRESS NOTES
Clinic Care Coordination Contact  Care Coordination Clinician Chart Review    Situation: Patient chart reviewed by Care Coordinator.       Background: Care Coordination Program started: 10/9/2024. Initial assessment completed and patient-centered care plan(s) were developed with participation from patient. Lead CC handed patient off to CHW for continued outreaches.       Assessment: Per chart review, patient outreach completed by CC CHW on 3/27/25.  Patient is actively working to accomplish goal(s). Patient's goal(s) appropriate and relevant at this time. Patient is not due for updated Plan of Care.  Assessments will be completed annually or as needed/with change of patient status.      Care Plan: Community Resources       Problem: Unable to return to work       Note:     Goal Statement: Esteban will continue working with Care Coordination to ensure his needs are met for her overall health and wellbeing.  Date Goal Set: 10/15/24  Barriers: Financial    Strengths: Strong support system  Date to Achieve By: 10/15/25  Patient expressed understanding of goal: yes  Action steps to achieve this goal:  1. I will continue to follow up with medical team as needed  2. I will sign up for [employer] [facility]  3. I will review resources for applying for SSDI  4. I will work with FRW to discuss insurance, emergency assistance, SNAP  5. I will outreach to Care Coordination  for further questions or concerns          Goal: Establish adequate home support       This Visit's Progress: 60% Recent Progress: 50%                               Plan/Recommendations: The patient will continue working with Care Coordination to achieve goal(s) as above. CHW will continue outreaches at minimum every 30 days and will involve Lead CC as needed or if patient is ready to move to Maintenance. Lead CC will continue to monitor CHW outreaches and patient's progress to goal(s) every 6 weeks.     Plan of Care updated and sent to patient: Yes, via  mail    Bee Naik,  [facility]  Clinic Care Coordinator  [facility]  [phone]  [email or web address]

## 2025-03-31 NOTE — LETTER
[facility]  Patient Centered Plan of Care  About Me:        Patient Name:  Esteban Nicholas    YOB: 1960  Age:         65 year old   Jessica MRN:    [id number] Telephone Information:  Home Phone [phone]   Mobile [phone]       Address:  [address] [place] [address] [address] [facility]  [place] [address] Email address:  [email or web address]      Emergency Contact(s)    Name Relationship Lgl Grd Work Phone Home Phone Mobile Phone   1. ALLY LOPES Spouse   [phone]            Primary language:  English     needed? No   [facility]:  [phone] op. 1  Other communication barriers:English as a second language    Preferred Method of Communication:     Current living arrangement: I live in a private home with spouse    Mobility Status/ Medical Equipment: Independent        Health Maintenance  Health Maintenance Reviewed: Due/Overdue   Health Maintenance Due   Topic Date Due    ADVANCE CARE PLANNING  Never done    YEARLY PREVENTIVE VISIT  Never done    COLORECTAL CANCER SCREENING  Never done    HIV SCREENING  Never done    HEPATITIS C SCREENING  Never done    LIPID  Never done    Pneumococcal Vaccine: 50+ Years (1 of 1 - PCV) Never done    ZOSTER IMMUNIZATION (1 of 2) Never done    INFLUENZA VACCINE (1) 09/01/2024    COVID-19 Vaccine (4 - 2024-25 season) 09/01/2024    PHQ-2 (once per calendar year)  01/01/2025           My Access Plan  Medical Emergency 911   Primary Clinic Line [facility] - [phone]   24 Hour Appointment Line [phone] or  [phone] ([phone]) (toll-free)   24 Hour Nurse Line [phone] (toll-free)   Preferred Urgent Care [facility], [phone]     Preferred Hospital Other (No Preference)     Preferred Pharmacy [id number] - [place], [place] - [address] AT [facility] & [address]     Behavioral Health Crisis Line The National Suicide Prevention Lifeline at 1-882.611.1016 or  Text/Call 988           My Care Team Members  Patient Care Team         Relationship Specialty Notifications Start End    Jez Everett PA-C PCP - General Physician Assistant - Medical  2/14/24     Phone: [phone] Fax: [phone]         [address] [place] [address]    Jez Everett PA-C Assigned PCP   11/23/23     Phone: [phone] Fax: [phone] [address] [place] [address]    Bee Naik LICSW Lead Care Coordinator  Admissions 10/10/24     Phone: [phone]         Rama Vieira Financial Resource Worker  Admissions 3/27/25     Phone: [phone]                     My Care Plans  Self Management and Treatment Plan    Care Plan  Care Plan: Community Resources       Problem: Unable to return to work       Note:     Goal Statement: Esteban will continue working with Care Coordination to ensure his needs are met for her overall health and wellbeing.  Date Goal Set: 10/15/24  Barriers: Financial    Strengths: Strong support system  Date to Achieve By: 10/15/25  Patient expressed understanding of goal: yes  Action steps to achieve this goal:  1. I will continue to follow up with medical team as needed  2. I will sign up for [employer]  3. I will review resources for applying for SSDI  4. I will work with FRW to discuss insurance, emergency assistance, SNAP  5. I will outreach to Care Coordination  for further questions or concerns          Goal: Establish adequate home support       This Visit's Progress: 60% Recent Progress: 50%                            Action Plans on File:                       Advance Care Plans/Directives:             My Medical and Care Information  Problem List   Patient Active Problem List   Diagnosis    Acute right-sided low back pain with right-sided sciatica    Abnormal gait      Current Medications:  Please refer to the most recent medication list provided to you by your medical team and reach out to your  provider with any questions or to make any corrections.    Care Coordination Start Date: 10/9/2024   Frequency of Care Coordination: monthly, more frequently as needed     Form Last Updated: 03/31/2025

## 2025-04-08 ENCOUNTER — PATIENT OUTREACH (OUTPATIENT)
Dept: CARE COORDINATION | Facility: CLINIC | Age: 65
End: 2025-04-08

## 2025-04-08 NOTE — PROGRESS NOTES
FRW update   4/8/25  FRW called pt at scheduled time pt was not prepared for apt. Rescheduled for 4/11/25 at 4pm.

## 2025-04-30 ENCOUNTER — PATIENT OUTREACH (OUTPATIENT)
Dept: CARE COORDINATION | Facility: CLINIC | Age: 65
End: 2025-04-30

## 2025-04-30 NOTE — PROGRESS NOTES
Clinic Care Coordination Contact  Community Health Worker Follow Up    Care Gaps:     Health Maintenance Due   Topic Date Due    ADVANCE CARE PLANNING  Never done    YEARLY PREVENTIVE VISIT  Never done    COLORECTAL CANCER SCREENING  Never done    HIV SCREENING  Never done    HEPATITIS C SCREENING  Never done    LIPID  Never done    Pneumococcal Vaccine: 50+ Years (1 of 1 - PCV) Never done    ZOSTER IMMUNIZATION (1 of 2) Never done    PHQ-2 (once per calendar year)  01/01/2025       Did Not Address    Care Plan:   Care Plan: Community Resources       Problem: Unable to return to work       Note:     Goal Statement: Esteban will continue working with Care Coordination to ensure his needs are met for her overall health and wellbeing.  Date Goal Set: 10/15/24  Barriers: Financial    Strengths: Strong support system  Date to Achieve By: 10/15/25  Patient expressed understanding of goal: yes  Action steps to achieve this goal:  1. I will continue to follow up with medical team as needed  2. I will sign up for [employer]  3. I will review resources for applying for SSDI  4. I will work with FRW to discuss insurance, emergency assistance, SNAP  5. I will outreach to Care Coordination  for further questions or concerns          Goal: Establish adequate home support       This Visit's Progress: 70% Recent Progress: 60%                        Discussed:  Patient stated he arranged a payment plan for his utilities.  Patient stated he did not qualify for Energy Assistance.  Patient stated he is over income guidelines for MA.  Patient stated he goes to the [facility] for food.      Intervention and Education during outreach:     CHW encouraged patient to contact CC if there are any other changes or resources needed.  Patient acknowledged understanding.        CHW Plan: will outreach in one month    SIGIFREDO Navas  Clinic Care Coordination  [facility]: [place], [place], [place],  and [place] [facility]  Phone: [phone]

## 2025-05-12 ENCOUNTER — PATIENT OUTREACH (OUTPATIENT)
Dept: CARE COORDINATION | Facility: CLINIC | Age: 65
End: 2025-05-12

## 2025-05-12 NOTE — PROGRESS NOTES
Clinic Care Coordination Contact  Care Coordination Clinician Chart Review    Situation: Patient chart reviewed by Care Coordinator.       Background: Care Coordination Program started: 10/9/2024. Initial assessment completed and patient-centered care plan(s) were developed with participation from patient. Lead CC handed patient off to CHW for continued outreaches.       Assessment: Per chart review, patient outreach completed by CC CHW on 4/30/25.  Patient is actively working to accomplish goal(s). Patient's goal(s) appropriate and relevant at this time. Patient is not due for updated Plan of Care.  Assessments will be completed annually or as needed/with change of patient status.      Care Plan: Community Resources       Problem: Unable to return to work       Note:     Goal Statement: Esteban will continue working with Care Coordination to ensure his needs are met for her overall health and wellbeing.  Date Goal Set: 10/15/24  Barriers: Financial    Strengths: Strong support system  Date to Achieve By: 10/15/25  Patient expressed understanding of goal: yes  Action steps to achieve this goal:  1. I will continue to follow up with medical team as needed  2. I will sign up for [employer]  3. I will review resources for applying for SSDI  4. I will work with FRW to discuss insurance, emergency assistance, SNAP  5. I will outreach to Care Coordination  for further questions or concerns          Goal: Establish adequate home support       This Visit's Progress: 70% Recent Progress: 60%                               Plan/Recommendations: The patient will continue working with Care Coordination to achieve goal(s) as above. CHW will continue outreaches at minimum every 30 days and will involve Lead CC as needed or if patient is ready to move to Maintenance. Lead CC will continue to monitor CHW outreaches and patient's progress to goal(s) every 6 weeks.     Plan of Care updated and sent to patient:  No    Bee Naik,  [facility]  Clinic Care Coordinator  [facility]  [phone]  [email or web address]

## 2025-05-29 ENCOUNTER — PATIENT OUTREACH (OUTPATIENT)
Dept: CARE COORDINATION | Facility: CLINIC | Age: 65
End: 2025-05-29

## 2025-05-29 NOTE — LETTER
[facility] CARE COORDINATION  [address]  [place] [address]    May 29, 2025    Esteban Nicholas  [address]   [place] [address]      Dear Esteban,    I am a clinic community health worker who works with Jez Everett with the [facility]. I wanted to thank you for spending the time to talk with me.  Below is information regarding the resources we discussed:           Please feel free to contact me with any questions or concerns.           Sincerely,      Fior Pedraza, W  Clinic Care Coordination  [facility] Clinics: [place], [place], [place], and [facility]  Phone: [phone]        Enclosed: Food Resources                                                 [facility] FOOD SHELF RESOURCES  Grouped Geographically by the Counties They Serve  [facility] FOOD SHELF RESOURCES, (Pages 9 - 11)  [employer], www.hungersolutions.org [phone]  This site helps people locate food resources in [place]. Can search by zip code, city or county. Operates   [place] [employer] Helpline, 1-783.824.8089. They will assess a person s situation and provide solutions to the   food needs. Hours: M - F 8:30 am - 4:30 pm. Also has some information about pet food resources.  [employer], [address] [place] [address], [facility] http://thefoTansna Therapeuticsgroupmn.org (Formerly   Emergency Foodshelf Network) [phone].  A service to call and they will route a person to the closest food shelf near their home. Check their web site for   other programs. They have information about multicultural food resources. Also information about pet   food resources.   **Holiday: Baskets of Hope - baskets being delivered to local food shelves. Check with your food shelf**  [facility] [place] FOOD SHELF AND CLOTHING STORE ([facility]), [address]   [place], [address]. [phone]. www.PoachIt   AREA SERVED: [place], [place], [place], [place] & part of  [place].  HOURS: For walk-ins: Monday & Wednesday 12 - 3 pm; Tuesday & Thursday 6:30 - 8:30 pm. For   appointments (must call ahead) Tuesday & Thursday 2 - 4 pm; Friday 9 - 11 am .  VISITS ALLOWED: Once per month may choose a 3 day supply of food based on family size.  ID: Picture ID and a current piece of mail with address. Must also have same information for anyone over 21 in   the household  Note: Also has pet food.  **Holiday: - for current clients only - check with food shelf staff**  [facility], [address] [place] [address], [phone] (located at [facility], lower level, back entrance.)   www.[email or web address] IO.[employer]   AREA SERVED: Residents of [facility].  HOURS: Monday & Thursday from 4:00-6:00 pm except holidays.  VISITS ALLOWED: Once a month and receive 3-5 days' supply of food.   ID: Two forms of identification are required, including proof that you live in the CC service area (bring a   picture ID and a utility bill).  **Holiday: - baskets available at [facility], [facility], and [employer] for qualified families. Must register   in advance at the food shelf.**  [facility] EMERGENCY ASSISTANCE PROGRAM (CEAP)  [address], [address], [place] [address] [phone] www.cea.org  AREA SERVED: [place] office serves [facility].  HOURS: 8:00 am - 4:30 pm, Tuesday, Wednesday, Friday  VISITS ALLOWED: Once per month - call for appointment  ID: Photo ID for each adult (18 and over) in household; current mail (utility or medical bill); for children -  birth certificate, medical card, SS card, or current piece of mail for student.  **Holiday: Check with food shelf staff.  [address] [place] [address], [address], [place] [address] [phone]  www.islamiccentermn.org  AREA SERVED: Halal/Zabiha foods for needy Voodoo families of the [facility]  area  HOURS: 3rd Sunday each month 11 am - 1:30 pm. During the month of Ramadan it is open every Sunday from   11 am - 1:30 pm.  VISITS ALLOWED: Once per month   ID: No ID is required  [facility] EMERGENCY FOOD SHELF ([place]), [address], [address],   [place], [place]. [phone]. www.[facility].org  AREA SERVED: [facility], [place], [place], [place], [facility], [place], [place] and   [place].  HOURS: Monday 9 am - noon, Tuesday 5 - 8 pm, Wednesday 1 - 4 pm, Thursday 8- 11 am. Closed holidays.  VISITS ALLOWED: Once per month   ID: Photo ID and mail with current address for each adult in household.  Note: Clothing Closet is open during food distribution hours.  [facility] FOOD PANTRIES http://The Thomas Surprenant Makeup Academyationarmynorth.org   AREA SERVED: S.A. suggests calling [employer] 2-1-14 ([phone]) to find out which pantry serves  the client s area and the hours. Food pantry locations and hours listed below.   HOURS: See below. Walk in. No appointment necessary at most locations, but call to check before   going. Some are by appointment only.  VISITS ALLOWED: Once a month  ID: Picture ID, current piece of mail (less than 30 days old) for each adult 18 and over; Medical card, SS card,   Birth certificate or school ID for all children 17 and under.  Note: Some food shelves have Youth hours. Call location for information.   LOCATIONS:   [facility] Office: [address] [place] [address]. [phone].  Appointments required. Call for appointment. Hours: Mon-Thurs 8 to 11:30 am, 1 - 3 pm. Fri 8 am -  12 noon; and 1 - 3 pm.   [facility] Office: [address], [place] [address]. [phone]. Hours - Mon,   Tues, Wed & Fri 8:30 - 11 am, 1 - 3 pm. Thurs 1-3 pm.   [facility] Office, [address] [place] [address]. [phone].   Serves downtown area and shelters. Food shelf hours vary. Call for information.   Central/NEED Office: [address] [place] [address]. [phone]. Hours - Mon -  Fri 8 am - noon; 1 - 4 pm. Appointment or walk-in. Food shelf includes Hmong & Laotian foods.   [place] [facility] Office: [address], [place] [address]. [phone]. Use   [facility] Outpost entrance. Mon 1-3 pm, 5-7 pm; Tues 12-2 pm; Wed   5-7 pm; Thurs. 10:30 am - 12:30 pm.   [facility]: [address] [place] [address]. [phone]. Mon & Wed 9-11 am, new walkins 1-2:30 pm; This [facility] site is a Senior Citizens Sensitive Food Pantry. In addition to   providing nutritionally balanced food, this food pantry can deliver groceries to homebound senior   citizens (people with disabilities should also inquire.)   [facility]: [address] [place] [address]. [phone]. Mon - Fri 8 -11:30 am, 1-3:30   pm.    [facility]: [address] [place] [address] [phone]. By   Appointment. Mon & Thurs 10 am - 2 pm. This [facility] site is a Senior Citizens  Sensitive Food Pantry. In addition to providing nutritionally balanced food, this food pantry can   deliver groceries to homebound senior citizens (people with disabilities should also inquire.)  11  [facility] FOOD PANTRIES, (continued)  **Holiday: Holiday Food Baskets at some sites. Call site for information. Online registration for EarlySense .   EarlySense Hotline - [phone] open Mon - Fri 9 am - 4 pm to answer questions and take registrations**  [facility] ([facility]), [address], [place] [address] [phone] www.Eximia.org  AREA SERVED: [facility], [place], [place]  HOURS: Monday, Tuesday, Thursday 8 am - 4 pm. Wednesday 10:30 am - 6 pm. By appointment only. Call   above number the day ahead for appointment.  VISITS ALLOWED: Once per month   ID: Photo ID, 2 proofs of current address, Social Security card for each member of  family. For children need

## 2025-07-02 ENCOUNTER — PATIENT OUTREACH (OUTPATIENT)
Dept: CARE COORDINATION | Facility: CLINIC | Age: 65
End: 2025-07-02

## 2025-07-02 NOTE — PROGRESS NOTES
Clinic Care Coordination Contact  Community Health Worker Follow Up    Care Gaps:     Health Maintenance Due   Topic Date Due    ADVANCE CARE PLANNING  Never done    YEARLY PREVENTIVE VISIT  Never done    COLORECTAL CANCER SCREENING  Never done    HIV SCREENING  Never done    HEPATITIS C SCREENING  Never done    LIPID  Never done    PNEUMOCOCCAL VACCINE 50+ YEARS (1 of 1 - PCV) Never done    ZOSTER VACCINE (1 of 2) Never done    PHQ-2 (once per calendar year)  01/01/2025       Did Not Address    Care Plan:   Care Plan: Community Resources       Problem: Unable to return to work       Note:     Goal Statement: Esteban will continue working with Care Coordination to ensure his needs are met for her overall health and wellbeing.  Date Goal Set: 10/15/24  Barriers: Financial    Strengths: Strong support system  Date to Achieve By: 10/15/25  Patient expressed understanding of goal: yes  Action steps to achieve this goal:  1. I will continue to follow up with medical team as needed  2. I will sign up for [employer]- completed 5/29/25  3. I will review resources for applying for SSDI- completed 5/25/25  4. I will work with FRW to discuss insurance, emergency assistance, SNAP- completed 5/25/25  5. I will outreach to Care Coordination  for further questions or concerns          Goal: Establish adequate home support       This Visit's Progress: 90% Recent Progress: 80%                        Discussed:  Patient stated he received the food resources in the mail.  Patient stated he was going to apply for MA, but his wife's income was needed.    Patient stated he does not have any resource needs at this time.    Intervention and Education during outreach:     CHW asked patient if there are any outstanding [facility] bills to apply for [facility] [employer], patient declined- no outstanding balances.    CHW encouraged patient to contact CC if there are any other changes or resources needed.  Patient acknowledged understanding.       CHW Plan: will outreach in one month    Fior Pedraza, YEW  Clinic Care Coordination  [facility] Clinics: [place], [place], [place], and [place] for Women  Phone: [phone]

## 2025-07-07 ENCOUNTER — PATIENT OUTREACH (OUTPATIENT)
Dept: CARE COORDINATION | Facility: CLINIC | Age: 65
End: 2025-07-07

## 2025-07-07 NOTE — PROGRESS NOTES
Clinic Care Coordination Contact  Care Coordination Clinician Chart Review    Situation: Patient chart reviewed by Care Coordinator.       Background: Care Coordination Program started: 10/9/2024. Initial assessment completed and patient-centered care plan(s) were developed with participation from patient. Lead CC handed patient off to CHW for continued outreaches.       Assessment: Per chart review, patient outreach completed by CC CHW on 7/2/25.  Patient is actively working to accomplish goal(s). Patient's goal(s) appropriate and relevant at this time. Patient is not due for updated Plan of Care.  Assessments will be completed annually or as needed/with change of patient status.      Care Plan: Community Resources       Problem: Unable to return to work       Note:     Goal Statement: Esteban will continue working with Care Coordination to ensure his needs are met for her overall health and wellbeing.  Date Goal Set: 10/15/24  Barriers: Financial    Strengths: Strong support system  Date to Achieve By: 10/15/25  Patient expressed understanding of goal: yes  Action steps to achieve this goal:  1. I will continue to follow up with medical team as needed  2. I will sign up for [employer] Rx- completed 5/29/25  3. I will review resources for applying for SSDI- completed 5/25/25  4. I will work with FRW to discuss insurance, emergency assistance, SNAP- completed 5/25/25  5. I will outreach to Care Coordination  for further questions or concerns          Goal: Establish adequate home support       This Visit's Progress: 90% Recent Progress: 80%                               Plan/Recommendations: The patient will continue working with Care Coordination to achieve goal(s) as above. CHW will continue outreaches at minimum every 30 days and will involve Lead CC as needed or if patient is ready to move to Maintenance. Lead CC will continue to monitor CHW outreaches and patient's progress to goal(s) every 6  weeks.     Plan of Care updated and sent to patient: Yes, via mail    Bee Naik,  [facility]  Clinic Care Coordinator  [facility]  [phone]  [email or web address]

## 2025-07-07 NOTE — LETTER
[facility]  Patient Centered Plan of Care  About Me:        Patient Name:  Esteban Nicholas    YOB: 1960  Age:         65 year old   Jessica MRN:    [id number] Telephone Information:  Home Phone [phone]   Mobile [phone]       Address:  [address] [place] [address]  [place] [address] Email address:  [email or web address]      Emergency Contact(s)    Name Relationship Lgl Grd Work Phone Home Phone Mobile Phone   1. ALLY LOPES Spouse   [phone]            Primary language:  English     needed? No   [facility] [employer] [facility]:  [phone] op. 1  Other communication barriers:English as a second language    Preferred Method of Communication:     Current living arrangement: I live in a private home with spouse    Mobility Status/ Medical Equipment: Independent        Health Maintenance  Health Maintenance Reviewed: Due/Overdue   Health Maintenance Due   Topic Date Due    ADVANCE CARE PLANNING  Never done    YEARLY PREVENTIVE VISIT  Never done    COLORECTAL CANCER SCREENING  Never done    HIV SCREENING  Never done    HEPATITIS C SCREENING  Never done    LIPID  Never done    PNEUMOCOCCAL VACCINE 50+ YEARS (1 of 1 - PCV) Never done    ZOSTER VACCINE (1 of 2) Never done    PHQ-2 (once per calendar year)  01/01/2025           My Access Plan  Medical Emergency 911   Primary Clinic Line [facility] [phone]   24 Hour Appointment Line [phone] or  [phone]) (toll-free)   24 Hour Nurse Line [phone] (toll-free)   Preferred Urgent Care [facility], [phone]     Preferred Hospital Other (No Preference)     Preferred Pharmacy [id number] [place], [place] - [address] AT [facility] & [address]     Behavioral Health Crisis Line The National Suicide Prevention Lifeline at 1-693.374.4864 or Text/Call 098           My Care Team Members  Patient Care Team         Relationship Specialty  Notifications Start End    Jez Everett PA-C PCP - General Physician Assistant - Medical  2/14/24     Phone: [phone] Fax: [phone]         [address] [place] [address]    Jez Everett PA-C Assigned PCP   11/23/23     Phone: [phone] Fax: [phone]         [address] [place] [address]    Bee Naik [facility] Lead Care Coordinator  Admissions 10/10/24     Phone: [phone]                     My Care Plans  Self Management and Treatment Plan    Care Plan  Care Plan: Community Resources       Problem: Unable to return to work       Note:     Goal Statement: Esteban will continue working with Care Coordination to ensure his needs are met for her overall health and wellbeing.  Date Goal Set: 10/15/24  Barriers: Financial    Strengths: Strong support system  Date to Achieve By: 10/15/25  Patient expressed understanding of goal: yes  Action steps to achieve this goal:  1. I will continue to follow up with medical team as needed  2. I will sign up for [employer]- completed 5/29/25  3. I will review resources for applying for SSDI- completed 5/25/25  4. I will work with FRW to discuss insurance, emergency assistance, SNAP- completed 5/25/25  5. I will outreach to Care Coordination  for further questions or concerns          Goal: Establish adequate home support       This Visit's Progress: 90% Recent Progress: 80%                            Action Plans on File:                       Advance Care Plans/Directives:             My Medical and Care Information  Problem List   Patient Active Problem List   Diagnosis    Acute right-sided low back pain with right-sided sciatica    Abnormal gait      Current Medications:  Please refer to the most recent medication list provided to you by your medical team and reach out to your provider with any questions or to make any corrections.    Care Coordination Start Date: 10/9/2024   Frequency of Care Coordination:  monthly, more frequently as needed     Form Last Updated: 07/07/2025

## 2025-08-18 ENCOUNTER — PATIENT OUTREACH (OUTPATIENT)
Dept: CARE COORDINATION | Facility: CLINIC | Age: 65
End: 2025-08-18

## 2025-08-25 ENCOUNTER — PATIENT OUTREACH (OUTPATIENT)
Dept: CARE COORDINATION | Facility: CLINIC | Age: 65
End: 2025-08-25